# Patient Record
Sex: FEMALE | Race: WHITE | NOT HISPANIC OR LATINO | Employment: UNEMPLOYED | URBAN - METROPOLITAN AREA
[De-identification: names, ages, dates, MRNs, and addresses within clinical notes are randomized per-mention and may not be internally consistent; named-entity substitution may affect disease eponyms.]

---

## 2018-04-26 ENCOUNTER — HISTORICAL (OUTPATIENT)
Dept: LAB | Facility: HOSPITAL | Age: 58
End: 2018-04-26

## 2018-04-26 LAB
BUN SERPL-MCNC: 15 MG/DL (ref 7–18)
CALCIUM SERPL-MCNC: 9 MG/DL (ref 8.5–10.1)
CHLORIDE SERPL-SCNC: 105 MMOL/L (ref 98–107)
CO2 SERPL-SCNC: 26 MMOL/L (ref 21–32)
CREAT SERPL-MCNC: 1.27 MG/DL (ref 0.55–1.02)
CREAT/UREA NIT SERPL: 12
GLUCOSE SERPL-MCNC: 97 MG/DL (ref 74–106)
POTASSIUM SERPL-SCNC: 4.9 MMOL/L (ref 3.5–5.1)
SODIUM SERPL-SCNC: 140 MMOL/L (ref 136–145)

## 2018-12-14 ENCOUNTER — HISTORICAL (OUTPATIENT)
Dept: ADMINISTRATIVE | Facility: HOSPITAL | Age: 58
End: 2018-12-14

## 2019-08-15 ENCOUNTER — HISTORICAL (OUTPATIENT)
Dept: RADIOLOGY | Facility: HOSPITAL | Age: 59
End: 2019-08-15

## 2019-09-09 ENCOUNTER — HISTORICAL (OUTPATIENT)
Dept: RADIOLOGY | Facility: HOSPITAL | Age: 59
End: 2019-09-09

## 2019-09-30 ENCOUNTER — HISTORICAL (OUTPATIENT)
Dept: LAB | Facility: HOSPITAL | Age: 59
End: 2019-09-30

## 2019-10-07 LAB
HUMAN PAPILLOMAVIRUS (HPV): NORMAL
PAP RECOMMENDATION EXT: NORMAL
PAP SMEAR: NORMAL

## 2021-12-22 ENCOUNTER — HISTORICAL (OUTPATIENT)
Dept: ADMINISTRATIVE | Facility: HOSPITAL | Age: 61
End: 2021-12-22

## 2022-02-02 ENCOUNTER — HISTORICAL (OUTPATIENT)
Dept: ADMINISTRATIVE | Facility: HOSPITAL | Age: 62
End: 2022-02-02

## 2022-03-28 ENCOUNTER — HISTORICAL (OUTPATIENT)
Dept: ADMINISTRATIVE | Facility: HOSPITAL | Age: 62
End: 2022-03-28

## 2022-03-28 ENCOUNTER — HISTORICAL (OUTPATIENT)
Dept: RADIOLOGY | Facility: HOSPITAL | Age: 62
End: 2022-03-28

## 2022-04-07 ENCOUNTER — HISTORICAL (OUTPATIENT)
Dept: ADMINISTRATIVE | Facility: HOSPITAL | Age: 62
End: 2022-04-07
Payer: MEDICAID

## 2022-04-23 VITALS
BODY MASS INDEX: 33.32 KG/M2 | OXYGEN SATURATION: 98 % | SYSTOLIC BLOOD PRESSURE: 170 MMHG | WEIGHT: 212.31 LBS | DIASTOLIC BLOOD PRESSURE: 82 MMHG | HEIGHT: 67 IN

## 2022-05-02 NOTE — HISTORICAL [facility]ER
This is a historical note converted from [facility]fredo. Formatting and pictures may have been removed.  Please reference Wilfredo for original formatting and attached multimedia. Chief Complaint  Follow up for right arm fracture  History of Present Illness  Ms Baker comes in clinic today for follow-up assessment of her?right?Jayson 1 radial head fracture.?She reports that she continues to have pain in the right elbow radiating down the dorsum of her?right forearm into the dorsum of her right hand.?She reports this pain is worse when attempting to extend her elbow.  ?  In clinic today she also complains that her right knee has been bothering her.?I would like it assessed as well.?Explains to me that she had a long history of pain with her right knee?that?comes and goes. She reports that is particularly bad today?and she has been ambulating with a limp.  Review of Systems  Constitutional:?no fever, fatigue, weakness  Eye:?no vision loss, eye redness, drainage, or pain  ENMT:?no sore throat, ear pain, sinus pain/congestion, nasal congestion/drainage  Respiratory:?no cough, no wheezing, no shortness of breath  Cardiovascular:?no chest pain, no palpitations, no edema  Gastrointestinal:?no nausea, vomiting, or diarrhea. No abdominal pain  ?  ?  Physical Exam  Vitals & Measurements  HT:?170.00?cm? WT:?96.300?kg? BMI:?33.32?  Right elbow: Patient neurovascular intact distally right hand. Range of motion is from 130? of flexion to 10? of extension. There is some tenderness palpation over the radial head.  ?  Right knee: Patient neurovascular intact distally the right leg. Range of motion from?full extension 110? of flexion. Patellofemoral crepitus with range of motion. Some tenderness palpation of the medial lateral joint lines.  ?  X-ray right elbow performed today reviewed with patient: I still see the fracture line in the patients radial head. ?It is minimally displaced.? There is some interval callus formation.  ?  X-ray right  knee performed today reviewed with patient:?Mild degenerative changes.? No fracture/dislocations.  Assessment/Plan  Knee pain?M25.569  ?I had a long discussion with the patient regarding her elbow and knee injuries. ?With respect to her right elbow?educated the patient that we need to work on aggressive range of motion. ?We will get her started on physiotherapy today to restore range of motion of her elbow.? Recommend to limit her?weightbearing to avoid pushing out from a chair?or repositioning herself in bed?until?her pain is reduced.  ?  With regard to the right knee I offer the patient?an injection?in clinic today however she reports that is not particularly bothersome at todays visit.? She will?work on physiotherapy of the knee?while doing elbow range of motion exercises.? If she experiences an increase in pain she will call the clinic and come in for reassessment and possible injection.  ?  I would like to see the patient back in 6 to 8 weeks for reassessment of her right elbow.  Ordered:  Clinic Follow up, *Est. 02/02/22 3:00:00 CST, Order for future visit, Radial head fracture  Knee pain, [facility]  Office/Outpatient Visit Level 4 Established [id number] PC, Radial head fracture  Knee pain, [facility], 12/22/21 10:13:00 CST  XR Knee Right 4 or More Views, Routine, 12/22/21 10:09:00 CST, None, Ambulatory, Rad Type, Knee pain, Not Scheduled, 12/22/21 10:09:00 CST  ?  Radial head fracture?S52.123A  Ordered:  Clinic Follow up, *Est. 02/02/22 3:00:00 CST, Order for future visit, Radial head fracture  Knee pain, [facility]  Office/Outpatient Visit Level 4 Established [id number] PC, Radial head fracture  Knee pain, [facility], 12/22/21 10:13:00 CST  XR Elbow Right Minimum 3 Views, Routine, 12/22/21 10:09:00 CST, None, Ambulatory, Rad Type, Radial head fracture, Not Scheduled, 12/22/21 10:09:00 CST  ?  Referrals  Clinic Follow up, *Est. 02/02/22 3:00:00 CST, Order for future visit, Radial head fracture   Knee pain, [facility]   Problem List/Past Medical History  Ongoing  Anemia  Anxiety and depression  Atherosclerosis  Breast cancer screening  CAD (coronary artery disease)  GERD (gastroesophageal reflux disease)  HLD - Hyperlipidaemia  HTN - Hypertension  Hx of CABG  Insomnia  Obesity  PAD - Peripheral arterial disease  Smoker  TIA  Historical  Depression  Depression  Procedure/Surgical History  Bypass CABG (.) (02/19/2021)  Bypass Coronary Artery, One Artery from Left Internal Mammary, Open Approach (02/19/2021)  Bypass Coronary Artery, Three Arteries from Coronary Artery with Autologous Venous Tissue, Open Approach (02/19/2021)  Excision of Left Saphenous Vein, Open Approach (02/19/2021)  Performance of Cardiac Output, Continuous (02/19/2021)  Fluoroscopy of Left Heart using Other Contrast (02/17/2021)  Fluoroscopy of Multiple Coronary Arteries using Other Contrast (02/17/2021)  Measurement of Cardiac Sampling and Pressure, Left Heart, Percutaneous Approach (02/17/2021)  Ultrasonography of Heart with Aorta, Transesophageal (02/16/2021)  Dilation of Left Femoral Artery with Intraluminal Device, using Drug-Coated Balloon, Percutaneous Approach (12/10/2018)  Extirpation of Matter from Left Femoral Artery, Percutaneous Approach (12/10/2018)  Fluoroscopy of Abdominal Aorta using Other Contrast (12/10/2018)  Fluoroscopy of Left Lower Extremity Arteries using Other Contrast (12/10/2018)  Measurement of Arterial Flow, Peripheral, External Approach (12/10/2018)  Ultrasonography of Left Lower Extremity Veins (12/10/2018)  CEA - Carotid endarterectomy (2017)  Endarterectomy (2007)  Cardiac Stent placement (2003)  bilateral kidney stents   Medications  aspirin 81 mg oral Delayed Release (EC) tablet, 81 mg= 1 tab(s), Oral, Daily, 4 refills  atorvastatin 80 mg oral tablet, 80 mg= 1 tab(s), Oral, Daily  citalopram 40 mg oral tablet, 40 mg= 1 tab(s), Oral, Daily  cloniDINE 0.1 mg oral tablet, 0.1 mg= 1 tab(s), Oral, BID,  PRN, 1 refills  clopidogrel 75 mg oral tablet, 75 mg= 1 tab(s), Oral, Daily,? ?Not Taking per Prescriber  eszopiclone 2 mg oral tablet, 2 mg= 1 tab(s), Oral, qPM,? ?Not Taking, Completed Rx  fluticasone 50 mcg/inh nasal spray, 1 spray(s), Both Nostrils, Daily  gabapentin 300 mg oral capsule, 600 mg= 2 cap(s), Oral, QID, 1 refills  Hydroxyzine Pamoate 50 Mg Cap Sand, See Instructions  hydrOXYzine pamoate 50 mg oral capsule, See Instructions,? ?Not taking: duplicate  lisinopril 40 mg oral tablet, 20 mg= 0.5 tab(s), Oral, Daily, 4 refills,? ?Not Taking per Prescriber  Multi Vitamin+  nitroglycerin 0.4 mg sublingual TAB, 0.4 mg= 1 tab(s), SL, q5min, PRN, 1 refills  Pantoprazole 40 mg ORAL EC-Tablet, 40 mg= 1 tab(s), Oral, Daily  Toprol-XL 50 mg oral tablet, extended release, 50 mg= 1 tab(s), Oral, Daily, 4 refills,? ?Still taking, not as prescribed: 2 tabs qd  Allergies  No Known Allergies  Social History  Abuse/Neglect  No, 12/22/2021  Alcohol  Never, 02/06/2018  Employment/School  Retired, 12/20/2021  Exercise  Exercise duration: 0., 02/06/2018  Financial/Legal Situation  Social Security, 12/20/2021  Home/Environment  Lives with Spouse, grandson., 02/06/2018    Never in , 12/20/2021  Nutrition/Health  Regular, 02/06/2018  Sexual  Sexually active: Yes., 02/06/2018  Spiritual/Cultural  Non denomination, 12/20/2021  Substance Use  Current, Marijuana, Daily, 12/20/2021  Tobacco  10 or more cigarettes (1/2 pack or more)/day in last 30 days, No, 12/22/2021  Family History  Alzheimers disease: Sister.  Hyperthyroidism.: Sister.  Hypothyroidism.: Sister.  Metastatic cancer: Sister.  Primary malignant neoplasm of breast: Sister.  Stroke: Father.  Uterine cancer: Sister.  Immunizations  Vaccine Date Status Comments   influenza virus vaccine, inactivated 12/20/2021 Given Tolerated well   influenza virus vaccine, inactivated 09/28/2020 Given    influenza virus vaccine, inactivated 12/12/2018 Given     tetanus/diphth/pertuss (Tdap) adult/adol 09/05/2017 Recorded    influenza virus vaccine, inactivated 09/05/2017 Recorded    Health Maintenance  Health Maintenance  ???Pending?(in the next year)  ??? ??OverDue  ??? ? ? ?Alcohol Misuse Screening due??01/02/21??and every 1??year(s)  ??? ? ? ?Breast Cancer Screening due??09/08/21??and every 2??year(s)  ??? ??Due?  ??? ? ? ?Colorectal Screening due??12/22/21??Unknown Frequency  ??? ? ? ?Zoster Vaccine due??12/22/21??Unknown Frequency  ??? ??Due In Future?  ??? ? ? ?Obesity Screening not due until??01/01/22??and every 1??year(s)  ??? ? ? ?ADL Screening not due until??02/23/22??and every 1??year(s)  ??? ? ? ?Hypertension Management-Education not due until??03/10/22??and every 1??year(s)  ??? ? ? ?Aspirin Therapy for CVD Prevention not due until??04/10/22??and every 1??year(s)  ??? ? ? ?Hypertension Management-BMP not due until??06/23/22??and every 1??year(s)  ??? ? ? ?Cervical Cancer Screening not due until??09/29/22??and every 3??year(s)  ??? ? ? ?Blood Pressure Screening not due until??12/20/22??and every 1??year(s)  ??? ? ? ?Hypertension Management-Blood Pressure not due until??12/20/22??and every 1??year(s)  ???Satisfied?(in the past 1 year)  ??? ??Satisfied?  ??? ? ? ?ADL Screening on??02/23/21.??Satisfied by Juan Jose Bates  ??? ? ? ?Aspirin Therapy for CVD Prevention on??04/10/21.??Satisfied by Randi Salas RN  ??? ? ? ?Blood Pressure Screening on??12/20/21.??Satisfied by Vicky Ventura LPN.  ??? ? ? ?Body Mass Index Check on??12/22/21.??Satisfied by Dia Azevedo RN  ??? ? ? ?Coronary Artery Disease Maintenance-Lipid Lowering Therapy on??12/20/21.??Satisfied by Chary Smith NP  ??? ? ? ?Depression Screening on??12/22/21.??Satisfied by Dia Azevedo RN  ??? ? ? ?Diabetes Screening on??04/10/21.??Satisfied by Annette Flores  ??? ? ? ?Hypertension Management-Blood Pressure on??12/20/21.??Satisfied by Vicky Ventura LPN.  ??? ? ? ?Influenza  Vaccine on??12/20/21.??Satisfied by Vicky Ventura LPN.  ??? ? ? ?Obesity Screening on??12/22/21.??Satisfied by Dia Azevedo RN  ??? ??Refused?  ??? ? ? ?Zoster Vaccine on??04/22/21.??Recorded by Jazzy Atkins  ?

## 2022-05-03 DIAGNOSIS — N18.1 CKD (CHRONIC KIDNEY DISEASE) STAGE 1, GFR 90 ML/MIN OR GREATER: Primary | ICD-10-CM

## 2022-05-25 RX ORDER — GABAPENTIN 300 MG/1
300 CAPSULE ORAL 3 TIMES DAILY
Qty: 90 CAPSULE | Refills: 0 | Status: SHIPPED | OUTPATIENT
Start: 2022-05-25 | End: 2022-06-13 | Stop reason: SDUPTHER

## 2022-05-25 RX ORDER — GABAPENTIN 300 MG/1
300 CAPSULE ORAL 3 TIMES DAILY
Qty: 90 CAPSULE | Refills: 0 | Status: SHIPPED | OUTPATIENT
Start: 2022-05-25 | End: 2022-05-25 | Stop reason: SDUPTHER

## 2022-05-25 RX ORDER — GABAPENTIN 300 MG/1
CAPSULE ORAL
Qty: 90 CAPSULE | Refills: 0 | Status: SHIPPED | OUTPATIENT
Start: 2022-05-25 | End: 2022-05-25 | Stop reason: SDUPTHER

## 2022-05-30 ENCOUNTER — TELEPHONE (OUTPATIENT)
Dept: INTERNAL MEDICINE | Facility: CLINIC | Age: 62
End: 2022-05-30
Payer: MEDICAID

## 2022-05-30 NOTE — TELEPHONE ENCOUNTER
"Pt called requesting a script for Vancomycin. Pt stated I have C-Diff again, yesterday my blood pressure dropped to 74/44 and I'm feeling dizzy, I didn't check it today, but you know when you know what you have because you had it before, that along   with diarrhea, this all started Saturday 05/28". Pt denies sob, chest pain, nausea, vomiting . I questioned patient if he went to the emergency room when she took her blood pressure and it was 74/44. She stated " No, we are  going on a vacation with my grand childred, and here I start with this sh--, every thing is paid for". Informed patient I will send a message to WYATT Smith , she needs to go the the emergency room to be evaluated.   "

## 2022-05-30 NOTE — TELEPHONE ENCOUNTER
"Spoke with WYATT Smith, pt needs to go to the emergency room to be evaluated. I called pt to inform, she stated "okay christiana-, I'll go".   "

## 2022-06-15 ENCOUNTER — OFFICE VISIT (OUTPATIENT)
Dept: INTERNAL MEDICINE | Facility: CLINIC | Age: 62
End: 2022-06-15
Payer: MEDICAID

## 2022-06-15 VITALS
HEIGHT: 66 IN | DIASTOLIC BLOOD PRESSURE: 62 MMHG | SYSTOLIC BLOOD PRESSURE: 148 MMHG | OXYGEN SATURATION: 98 % | WEIGHT: 194.44 LBS | RESPIRATION RATE: 16 BRPM | TEMPERATURE: 98 F | BODY MASS INDEX: 31.25 KG/M2

## 2022-06-15 DIAGNOSIS — F33.0 MILD EPISODE OF RECURRENT MAJOR DEPRESSIVE DISORDER: ICD-10-CM

## 2022-06-15 DIAGNOSIS — F41.9 ANXIETY: Primary | ICD-10-CM

## 2022-06-15 PROCEDURE — 3008F PR BODY MASS INDEX (BMI) DOCUMENTED: ICD-10-PCS | Mod: CPTII,,, | Performed by: NURSE PRACTITIONER

## 2022-06-15 PROCEDURE — 3077F PR MOST RECENT SYSTOLIC BLOOD PRESSURE >= 140 MM HG: ICD-10-PCS | Mod: CPTII,,, | Performed by: NURSE PRACTITIONER

## 2022-06-15 PROCEDURE — 99214 OFFICE O/P EST MOD 30 MIN: CPT | Mod: S$PBB,,, | Performed by: NURSE PRACTITIONER

## 2022-06-15 PROCEDURE — 3008F BODY MASS INDEX DOCD: CPT | Mod: CPTII,,, | Performed by: NURSE PRACTITIONER

## 2022-06-15 PROCEDURE — 1160F PR REVIEW ALL MEDS BY PRESCRIBER/CLIN PHARMACIST DOCUMENTED: ICD-10-PCS | Mod: CPTII,,, | Performed by: NURSE PRACTITIONER

## 2022-06-15 PROCEDURE — 3077F SYST BP >= 140 MM HG: CPT | Mod: CPTII,,, | Performed by: NURSE PRACTITIONER

## 2022-06-15 PROCEDURE — 1160F RVW MEDS BY RX/DR IN RCRD: CPT | Mod: CPTII,,, | Performed by: NURSE PRACTITIONER

## 2022-06-15 PROCEDURE — 99214 OFFICE O/P EST MOD 30 MIN: CPT | Mod: PBBFAC | Performed by: NURSE PRACTITIONER

## 2022-06-15 PROCEDURE — 99214 PR OFFICE/OUTPT VISIT, EST, LEVL IV, 30-39 MIN: ICD-10-PCS | Mod: S$PBB,,, | Performed by: NURSE PRACTITIONER

## 2022-06-15 PROCEDURE — 3078F PR MOST RECENT DIASTOLIC BLOOD PRESSURE < 80 MM HG: ICD-10-PCS | Mod: CPTII,,, | Performed by: NURSE PRACTITIONER

## 2022-06-15 PROCEDURE — 3078F DIAST BP <80 MM HG: CPT | Mod: CPTII,,, | Performed by: NURSE PRACTITIONER

## 2022-06-15 PROCEDURE — 1159F PR MEDICATION LIST DOCUMENTED IN MEDICAL RECORD: ICD-10-PCS | Mod: CPTII,,, | Performed by: NURSE PRACTITIONER

## 2022-06-15 PROCEDURE — 4010F ACE/ARB THERAPY RXD/TAKEN: CPT | Mod: CPTII,,, | Performed by: NURSE PRACTITIONER

## 2022-06-15 PROCEDURE — 1159F MED LIST DOCD IN RCRD: CPT | Mod: CPTII,,, | Performed by: NURSE PRACTITIONER

## 2022-06-15 PROCEDURE — 4010F PR ACE/ARB THEARPY RXD/TAKEN: ICD-10-PCS | Mod: CPTII,,, | Performed by: NURSE PRACTITIONER

## 2022-06-15 RX ORDER — ATORVASTATIN CALCIUM 80 MG/1
80 TABLET, FILM COATED ORAL DAILY
COMMUNITY
Start: 2022-04-12 | End: 2023-07-13 | Stop reason: SDUPTHER

## 2022-06-15 RX ORDER — LISINOPRIL 40 MG/1
40 TABLET ORAL DAILY
COMMUNITY
Start: 2022-03-22 | End: 2024-01-29 | Stop reason: ALTCHOICE

## 2022-06-15 RX ORDER — FLUOXETINE HYDROCHLORIDE 20 MG/1
20 CAPSULE ORAL DAILY
Qty: 30 CAPSULE | Refills: 11 | Status: SHIPPED | OUTPATIENT
Start: 2022-06-15 | End: 2023-07-13

## 2022-06-15 RX ORDER — FLUOXETINE 10 MG/1
10 CAPSULE ORAL
COMMUNITY
Start: 2022-03-22 | End: 2022-06-15 | Stop reason: DRUGHIGH

## 2022-06-15 RX ORDER — METOPROLOL TARTRATE 50 MG/1
50 TABLET ORAL
COMMUNITY
End: 2023-07-13

## 2022-06-15 RX ORDER — GABAPENTIN 300 MG/1
300 CAPSULE ORAL 3 TIMES DAILY
Qty: 30 CAPSULE | Refills: 0 | Status: SHIPPED | OUTPATIENT
Start: 2022-06-15 | End: 2022-06-16

## 2022-06-15 RX ORDER — OMEPRAZOLE 20 MG/1
20 TABLET, DELAYED RELEASE ORAL EVERY MORNING
COMMUNITY

## 2022-06-15 RX ORDER — CLOPIDOGREL BISULFATE 75 MG/1
75 TABLET ORAL DAILY
COMMUNITY
Start: 2022-03-22 | End: 2022-10-24
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
## 2022-06-28 RX ORDER — FAMOTIDINE 20 MG/1
20 TABLET, FILM COATED ORAL
COMMUNITY
Start: 2022-02-08 | End: 2023-07-13

## 2022-06-28 RX ORDER — EZETIMIBE 10 MG/1
10 TABLET ORAL DAILY
COMMUNITY
Start: 2022-06-23

## 2022-06-29 ENCOUNTER — OFFICE VISIT (OUTPATIENT)
Dept: INTERNAL MEDICINE | Facility: CLINIC | Age: 62
End: 2022-06-29
Payer: MEDICAID

## 2022-06-29 VITALS
HEART RATE: 65 BPM | DIASTOLIC BLOOD PRESSURE: 89 MMHG | TEMPERATURE: 99 F | SYSTOLIC BLOOD PRESSURE: 149 MMHG | RESPIRATION RATE: 20 BRPM | WEIGHT: 194.63 LBS | BODY MASS INDEX: 31.28 KG/M2 | HEIGHT: 66 IN

## 2022-06-29 DIAGNOSIS — F41.9 ANXIETY: ICD-10-CM

## 2022-06-29 DIAGNOSIS — F33.9 RECURRENT MAJOR DEPRESSIVE DISORDER, REMISSION STATUS UNSPECIFIED: Primary | ICD-10-CM

## 2022-06-29 DIAGNOSIS — R45.4 IRRITABILITY: ICD-10-CM

## 2022-06-29 PROCEDURE — 3079F DIAST BP 80-89 MM HG: CPT | Mod: CPTII,,, | Performed by: NURSE PRACTITIONER

## 2022-06-29 PROCEDURE — 3008F PR BODY MASS INDEX (BMI) DOCUMENTED: ICD-10-PCS | Mod: CPTII,,, | Performed by: NURSE PRACTITIONER

## 2022-06-29 PROCEDURE — 1160F PR REVIEW ALL MEDS BY PRESCRIBER/CLIN PHARMACIST DOCUMENTED: ICD-10-PCS | Mod: CPTII,,, | Performed by: NURSE PRACTITIONER

## 2022-06-29 PROCEDURE — 99213 OFFICE O/P EST LOW 20 MIN: CPT | Mod: S$PBB,,, | Performed by: NURSE PRACTITIONER

## 2022-06-29 PROCEDURE — 4010F PR ACE/ARB THEARPY RXD/TAKEN: ICD-10-PCS | Mod: CPTII,,, | Performed by: NURSE PRACTITIONER

## 2022-06-29 PROCEDURE — 1159F PR MEDICATION LIST DOCUMENTED IN MEDICAL RECORD: ICD-10-PCS | Mod: CPTII,,, | Performed by: NURSE PRACTITIONER

## 2022-06-29 PROCEDURE — 3077F SYST BP >= 140 MM HG: CPT | Mod: CPTII,,, | Performed by: NURSE PRACTITIONER

## 2022-06-29 PROCEDURE — 99214 OFFICE O/P EST MOD 30 MIN: CPT | Mod: PBBFAC | Performed by: NURSE PRACTITIONER

## 2022-06-29 PROCEDURE — 1160F RVW MEDS BY RX/DR IN RCRD: CPT | Mod: CPTII,,, | Performed by: NURSE PRACTITIONER

## 2022-06-29 PROCEDURE — 3008F BODY MASS INDEX DOCD: CPT | Mod: CPTII,,, | Performed by: NURSE PRACTITIONER

## 2022-06-29 PROCEDURE — 1159F MED LIST DOCD IN RCRD: CPT | Mod: CPTII,,, | Performed by: NURSE PRACTITIONER

## 2022-06-29 PROCEDURE — 4010F ACE/ARB THERAPY RXD/TAKEN: CPT | Mod: CPTII,,, | Performed by: NURSE PRACTITIONER

## 2022-06-29 PROCEDURE — 3079F PR MOST RECENT DIASTOLIC BLOOD PRESSURE 80-89 MM HG: ICD-10-PCS | Mod: CPTII,,, | Performed by: NURSE PRACTITIONER

## 2022-06-29 PROCEDURE — 99213 PR OFFICE/OUTPT VISIT, EST, LEVL III, 20-29 MIN: ICD-10-PCS | Mod: S$PBB,,, | Performed by: NURSE PRACTITIONER

## 2022-06-29 PROCEDURE — 3077F PR MOST RECENT SYSTOLIC BLOOD PRESSURE >= 140 MM HG: ICD-10-PCS | Mod: CPTII,,, | Performed by: NURSE PRACTITIONER

## 2022-06-29 RX ORDER — GABAPENTIN 300 MG/1
300 CAPSULE ORAL 3 TIMES DAILY
Qty: 90 CAPSULE | Refills: 0 | Status: SHIPPED | OUTPATIENT
Start: 2022-06-29 | End: 2023-07-13
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
## 2022-08-02 RX ORDER — GABAPENTIN 300 MG/1
300 CAPSULE ORAL 3 TIMES DAILY
Qty: 90 CAPSULE | Refills: 0 | OUTPATIENT
Start: 2022-08-02 | End: 2023-08-02

## 2023-01-09 ENCOUNTER — DOCUMENTATION ONLY (OUTPATIENT)
Dept: ADMINISTRATIVE | Facility: HOSPITAL | Age: 63
End: 2023-01-09
Payer: MEDICAID

## 2023-02-08 ENCOUNTER — PATIENT MESSAGE (OUTPATIENT)
Dept: ADMINISTRATIVE | Facility: HOSPITAL | Age: 63
End: 2023-02-08

## 2023-07-13 ENCOUNTER — OFFICE VISIT (OUTPATIENT)
Dept: FAMILY MEDICINE | Facility: CLINIC | Age: 63
End: 2023-07-13
Payer: COMMERCIAL

## 2023-07-13 VITALS
DIASTOLIC BLOOD PRESSURE: 88 MMHG | TEMPERATURE: 99 F | SYSTOLIC BLOOD PRESSURE: 136 MMHG | WEIGHT: 146.5 LBS | RESPIRATION RATE: 16 BRPM | HEART RATE: 61 BPM | BODY MASS INDEX: 23.54 KG/M2 | HEIGHT: 66 IN | OXYGEN SATURATION: 99 %

## 2023-07-13 DIAGNOSIS — Z95.1 HX OF CABG: ICD-10-CM

## 2023-07-13 DIAGNOSIS — F41.8 MIXED ANXIETY DEPRESSIVE DISORDER: ICD-10-CM

## 2023-07-13 DIAGNOSIS — D64.9 ANEMIA, UNSPECIFIED TYPE: ICD-10-CM

## 2023-07-13 DIAGNOSIS — K21.9 GASTROESOPHAGEAL REFLUX DISEASE, UNSPECIFIED WHETHER ESOPHAGITIS PRESENT: ICD-10-CM

## 2023-07-13 DIAGNOSIS — Z53.29: ICD-10-CM

## 2023-07-13 DIAGNOSIS — Z78.0 POSTMENOPAUSAL: ICD-10-CM

## 2023-07-13 DIAGNOSIS — I10 PRIMARY HYPERTENSION: ICD-10-CM

## 2023-07-13 DIAGNOSIS — Z12.31 BREAST CANCER SCREENING BY MAMMOGRAM: ICD-10-CM

## 2023-07-13 DIAGNOSIS — E78.2 MIXED HYPERLIPIDEMIA: ICD-10-CM

## 2023-07-13 DIAGNOSIS — I77.9 PERIPHERAL ARTERIAL OCCLUSIVE DISEASE: ICD-10-CM

## 2023-07-13 DIAGNOSIS — F17.200 CURRENT SMOKER: ICD-10-CM

## 2023-07-13 DIAGNOSIS — Z00.00 WELLNESS EXAMINATION: Primary | ICD-10-CM

## 2023-07-13 DIAGNOSIS — I25.10 CORONARY ARTERY DISEASE, UNSPECIFIED VESSEL OR LESION TYPE, UNSPECIFIED WHETHER ANGINA PRESENT, UNSPECIFIED WHETHER NATIVE OR TRANSPLANTED HEART: ICD-10-CM

## 2023-07-13 DIAGNOSIS — Z12.11 COLON CANCER SCREENING: ICD-10-CM

## 2023-07-13 PROBLEM — E66.9 OBESITY: Status: ACTIVE | Noted: 2023-07-13

## 2023-07-13 PROBLEM — G47.00 INSOMNIA: Status: ACTIVE | Noted: 2023-07-13

## 2023-07-13 PROBLEM — E66.9 OBESITY: Status: RESOLVED | Noted: 2023-07-13 | Resolved: 2023-07-13

## 2023-07-13 PROBLEM — E78.5 HYPERLIPIDEMIA: Status: ACTIVE | Noted: 2023-07-13

## 2023-07-13 PROCEDURE — 99396 PR PREVENTIVE VISIT,EST,40-64: ICD-10-PCS | Mod: ,,, | Performed by: FAMILY MEDICINE

## 2023-07-13 PROCEDURE — 99396 PREV VISIT EST AGE 40-64: CPT | Mod: ,,, | Performed by: FAMILY MEDICINE

## 2023-07-13 RX ORDER — AMLODIPINE BESYLATE 2.5 MG/1
2.5 TABLET ORAL
COMMUNITY
Start: 2023-06-08 | End: 2023-07-13 | Stop reason: SDUPTHER

## 2023-07-13 RX ORDER — AMLODIPINE BESYLATE 2.5 MG/1
5 TABLET ORAL DAILY
Qty: 180 TABLET | Refills: 3
Start: 2023-07-13 | End: 2023-11-16 | Stop reason: SDUPTHER

## 2023-07-13 RX ORDER — ATORVASTATIN CALCIUM 80 MG/1
80 TABLET, FILM COATED ORAL DAILY
Qty: 90 TABLET | Refills: 3 | Status: SHIPPED | OUTPATIENT
Start: 2023-07-13 | End: 2024-07-12

## 2023-07-13 RX ORDER — METOPROLOL SUCCINATE 50 MG/1
50 TABLET, EXTENDED RELEASE ORAL EVERY MORNING
COMMUNITY
Start: 2023-06-19 | End: 2023-11-16 | Stop reason: SDUPTHER

## 2023-07-13 RX ORDER — FLUOXETINE HYDROCHLORIDE 40 MG/1
40 CAPSULE ORAL EVERY MORNING
COMMUNITY
Start: 2023-06-19 | End: 2024-01-29 | Stop reason: SDUPTHER

## 2023-07-13 NOTE — ASSESSMENT & PLAN NOTE
Fasting labs ordered. Will call with results when available  mmg ordered  dexa ordered  cologuard with previous pcp. Will request  Declines pap today. Plan on at next appt  Encouraged smoking cessation  Declines lung cancer screening

## 2023-07-13 NOTE — ASSESSMENT & PLAN NOTE
Initial reading elevated. Repeat improved to goal.  Reports high readings at home and headaches.  Will increase norvasc to 5mg daily. Monitor bp. Call clinic for concerns and when refill is needed. Continue on lisinopril and metoprolol. Keep appts with cards. Not on asa.

## 2023-07-13 NOTE — PROGRESS NOTES
"Subjective:        Patient ID: Damaris Baker is a 62 y.o. female.    Chief Complaint: Establish Care (New patient est care /She was a former patient of ours approx 3 years ago )      Patient presents to clinic to establish care. Previously seeing francois grajeda.  She would like a wellness visit today. She is due for labs    She has cad and had cabg x4 with dr. Peter 2/2021, pad, htn and hld. Has also had CEA.  She is swimming. Walking dog. She is retired.  Bp has been running high at home.  Sometimes getting headache.  Reports compliance with meds.  Needs refill of statin. Not taking asa. Not on plavix.  Her cards is dr. Workman/stephon.    She has anxiety/depression. She is on prozac 40mg daily. Doing well.     She has gerd.  Is on ppi    She has history of anemia. Is taking MVI with iron. Will check levels.     She is a smoker. Started at age 10. She declines lung cancer screening.  Mmg couple years ago. We will order. Cologuard done with previous pcp. We will request.  Last pap 10/2019 was normal and hpv negative. Declines pap today. Will plan at next appt.  Denies problems or concerns.  She is postmenopausal.  We will order dexa.    She is not allergic to any medications.              Review of Systems   Constitutional: Negative.    HENT: Negative.     Respiratory: Negative.     Cardiovascular: Negative.    Gastrointestinal: Negative.    Genitourinary: Negative.        Review of patient's allergies indicates:  No Known Allergies   Vitals:    07/13/23 1412 07/13/23 1441   BP: (!) 142/90 136/88   BP Location: Left arm    Pulse: 61    Resp: 16    Temp: 98.7 °F (37.1 °C)    TempSrc: Temporal    SpO2: 99%    Weight: 66.5 kg (146 lb 8 oz)    Height: 5' 6" (1.676 m)       Social History     Socioeconomic History    Marital status:    Occupational History    Occupation: unemployed   Tobacco Use    Smoking status: Every Day     Packs/day: 0.50     Years: 42.00     Pack years: 21.00     Types: Cigarettes, Vaping " with nicotine    Smokeless tobacco: Current   Substance and Sexual Activity    Alcohol use: Not Currently    Drug use: Yes     Types: Marijuana     Comment: several times a week    Sexual activity: Yes     Partners: Male     Birth control/protection: Post-menopausal      Family History   Problem Relation Age of Onset    Alcohol abuse Father     Heart disease Father     Hypertension Father     Stroke Father     Cancer Sister     Cancer Sister     Depression Sister           Objective:     Physical Exam  Vitals and nursing note reviewed.   Constitutional:       Appearance: Normal appearance. She is normal weight.   HENT:      Head: Normocephalic and atraumatic.      Nose: Nose normal.      Mouth/Throat:      Mouth: Mucous membranes are moist.      Pharynx: Oropharynx is clear.   Eyes:      Extraocular Movements: Extraocular movements intact.   Cardiovascular:      Rate and Rhythm: Normal rate and regular rhythm.      Pulses: Normal pulses.      Heart sounds: Normal heart sounds.   Pulmonary:      Effort: Pulmonary effort is normal.      Breath sounds: Normal breath sounds.   Musculoskeletal:         General: Normal range of motion.      Cervical back: Normal range of motion.   Skin:     General: Skin is warm and dry.   Neurological:      General: No focal deficit present.      Mental Status: She is alert and oriented to person, place, and time. Mental status is at baseline.   Psychiatric:         Mood and Affect: Mood normal.     Current Outpatient Medications on File Prior to Visit   Medication Sig Dispense Refill    ezetimibe (ZETIA) 10 mg tablet Take 10 mg by mouth once daily.      FLUoxetine 40 MG capsule Take 40 mg by mouth every morning.      metoprolol succinate (TOPROL-XL) 50 MG 24 hr tablet Take 50 mg by mouth every morning.      multivit-min/ferrous fumarate (MULTI VITAMIN ORAL)   OTC G REEN AND RED multi vitamin, 0 Refill(s)      omeprazole (PRILOSEC OTC) 20 MG tablet Take 20 mg by mouth.       [DISCONTINUED] amLODIPine (NORVASC) 2.5 MG tablet Take 2.5 mg by mouth.      [DISCONTINUED] atorvastatin (LIPITOR) 80 MG tablet Take 80 mg by mouth once daily.      lisinopriL (PRINIVIL,ZESTRIL) 40 MG tablet Take 40 mg by mouth once daily.      [DISCONTINUED] clopidogreL (PLAVIX) 75 mg tablet TAKE ONE TABLET BY MOUTH ONE TIME DAILY (Patient not taking: Reported on 7/13/2023) 90 tablet 0    [DISCONTINUED] famotidine (PEPCID) 20 MG tablet Take 20 mg by mouth.      [DISCONTINUED] FLUoxetine 20 MG capsule Take 1 capsule (20 mg total) by mouth once daily. 30 capsule 11    [DISCONTINUED] gabapentin (NEURONTIN) 300 MG capsule Take 1 capsule (300 mg total) by mouth 3 (three) times daily. 90 capsule 0    [DISCONTINUED] metoprolol tartrate (LOPRESSOR) 50 MG tablet Take 50 mg by mouth.      [DISCONTINUED] valACYclovir (VALTREX) 1000 MG tablet Take 1,000 mg by mouth 3 (three) times daily.       No current facility-administered medications on file prior to visit.     Health Maintenance   Topic Date Due    Hepatitis C Screening  Never done    Mammogram  03/28/2023    LDCT Lung Screen  07/13/2024 (Originally 2/5/2023)    High Dose Statin  07/13/2024    Lipid Panel  08/29/2027    TETANUS VACCINE  09/05/2027      Results for orders placed or performed in visit on 01/09/23   HPV DNA probe, amplified    Specimen: Cervix; Genital   Result Value Ref Range    HPV DNA None Detected None Detected   HM PAP SMEAR   Result Value Ref Range    PAP Recommendation External No follow-up frequency specified     Pap Negative for intraephithelial lesion or malignancy Negative for intraephithelial lesion or malignancy, Other          Assessment & Plan:     Active Problem List with Overview Notes    Diagnosis Date Noted    Gastroesophageal reflux disease 07/13/2023    Hyperlipidemia 07/13/2023    Mixed anxiety depressive disorder 07/13/2023    Peripheral arterial occlusive disease 07/13/2023    Insomnia 07/13/2023    Current smoker 07/13/2023    Hx of  CABG 07/13/2023    Breast cancer screening by mammogram 07/13/2023    Anemia 07/13/2023    Colon cancer screening 07/13/2023    Wellness examination 07/13/2023    Postmenopausal 07/13/2023    Pap smear of cervix declined because of time constraints 07/13/2023    CAD (coronary artery disease) 05/31/2022    Primary hypertension 05/31/2022       1. Wellness examination  Assessment & Plan:  Fasting labs ordered. Will call with results when available  mmg ordered  dexa ordered  cologuard with previous pcp. Will request  Declines pap today. Plan on at next appt  Encouraged smoking cessation  Declines lung cancer screening    Orders:  -     CBC Auto Differential; Future; Expected date: 07/13/2023  -     Comprehensive Metabolic Panel; Future; Expected date: 07/13/2023  -     Lipid Panel; Future; Expected date: 07/13/2023  -     TSH; Future; Expected date: 07/13/2023  -     Urinalysis; Future; Expected date: 07/13/2023  -     Hepatitis C Antibody; Future; Expected date: 07/13/2023  -     Ferritin; Future; Expected date: 07/13/2023  -     Iron and TIBC; Future; Expected date: 07/13/2023    2. Colon cancer screening  Assessment & Plan:  cologuard with previous pcp. Will request record    Orders:  -     CBC Auto Differential; Future; Expected date: 07/13/2023  -     Comprehensive Metabolic Panel; Future; Expected date: 07/13/2023  -     Lipid Panel; Future; Expected date: 07/13/2023  -     TSH; Future; Expected date: 07/13/2023  -     Urinalysis; Future; Expected date: 07/13/2023  -     Hepatitis C Antibody; Future; Expected date: 07/13/2023  -     Ferritin; Future; Expected date: 07/13/2023  -     Iron and TIBC; Future; Expected date: 07/13/2023    3. Primary hypertension  Assessment & Plan:  Initial reading elevated. Repeat improved to goal.  Reports high readings at home and headaches.  Will increase norvasc to 5mg daily. Monitor bp. Call clinic for concerns and when refill is needed. Continue on lisinopril and metoprolol.  Keep appts with cards. Not on asa.     Orders:  -     CBC Auto Differential; Future; Expected date: 07/13/2023  -     Comprehensive Metabolic Panel; Future; Expected date: 07/13/2023  -     Lipid Panel; Future; Expected date: 07/13/2023  -     TSH; Future; Expected date: 07/13/2023  -     Urinalysis; Future; Expected date: 07/13/2023  -     Hepatitis C Antibody; Future; Expected date: 07/13/2023  -     amLODIPine (NORVASC) 2.5 MG tablet; Take 2 tablets (5 mg total) by mouth once daily.  Dispense: 180 tablet; Refill: 3  -     Ferritin; Future; Expected date: 07/13/2023  -     Iron and TIBC; Future; Expected date: 07/13/2023    4. Mixed hyperlipidemia  Assessment & Plan:  On statin and zetia. Keep appts with cards. Refill of statin sent in as requested.     Orders:  -     CBC Auto Differential; Future; Expected date: 07/13/2023  -     Comprehensive Metabolic Panel; Future; Expected date: 07/13/2023  -     Lipid Panel; Future; Expected date: 07/13/2023  -     TSH; Future; Expected date: 07/13/2023  -     Urinalysis; Future; Expected date: 07/13/2023  -     Hepatitis C Antibody; Future; Expected date: 07/13/2023  -     atorvastatin (LIPITOR) 80 MG tablet; Take 1 tablet (80 mg total) by mouth once daily.  Dispense: 90 tablet; Refill: 3  -     Ferritin; Future; Expected date: 07/13/2023  -     Iron and TIBC; Future; Expected date: 07/13/2023    5. Coronary artery disease, unspecified vessel or lesion type, unspecified whether angina present, unspecified whether native or transplanted heart  Assessment & Plan:  Stable. Keep appts with cards    Orders:  -     CBC Auto Differential; Future; Expected date: 07/13/2023  -     Comprehensive Metabolic Panel; Future; Expected date: 07/13/2023  -     Lipid Panel; Future; Expected date: 07/13/2023  -     TSH; Future; Expected date: 07/13/2023  -     Urinalysis; Future; Expected date: 07/13/2023  -     Hepatitis C Antibody; Future; Expected date: 07/13/2023  -     Ferritin; Future;  Expected date: 07/13/2023  -     Iron and TIBC; Future; Expected date: 07/13/2023    6. Peripheral arterial occlusive disease  Assessment & Plan:  Keep appts with cards    Orders:  -     CBC Auto Differential; Future; Expected date: 07/13/2023  -     Comprehensive Metabolic Panel; Future; Expected date: 07/13/2023  -     Lipid Panel; Future; Expected date: 07/13/2023  -     TSH; Future; Expected date: 07/13/2023  -     Urinalysis; Future; Expected date: 07/13/2023  -     Hepatitis C Antibody; Future; Expected date: 07/13/2023  -     Ferritin; Future; Expected date: 07/13/2023  -     Iron and TIBC; Future; Expected date: 07/13/2023    7. Mixed anxiety depressive disorder  Assessment & Plan:  Stable on prozac 40mg.     Orders:  -     CBC Auto Differential; Future; Expected date: 07/13/2023  -     Comprehensive Metabolic Panel; Future; Expected date: 07/13/2023  -     Lipid Panel; Future; Expected date: 07/13/2023  -     TSH; Future; Expected date: 07/13/2023  -     Urinalysis; Future; Expected date: 07/13/2023  -     Hepatitis C Antibody; Future; Expected date: 07/13/2023  -     Ferritin; Future; Expected date: 07/13/2023  -     Iron and TIBC; Future; Expected date: 07/13/2023    8. Gastroesophageal reflux disease, unspecified whether esophagitis present  Assessment & Plan:  Stable on ppi    Orders:  -     CBC Auto Differential; Future; Expected date: 07/13/2023  -     Comprehensive Metabolic Panel; Future; Expected date: 07/13/2023  -     Lipid Panel; Future; Expected date: 07/13/2023  -     TSH; Future; Expected date: 07/13/2023  -     Urinalysis; Future; Expected date: 07/13/2023  -     Hepatitis C Antibody; Future; Expected date: 07/13/2023  -     Ferritin; Future; Expected date: 07/13/2023  -     Iron and TIBC; Future; Expected date: 07/13/2023    9. Breast cancer screening by mammogram  Assessment & Plan:  mmg ordered    Orders:  -     CBC Auto Differential; Future; Expected date: 07/13/2023  -     Comprehensive  Metabolic Panel; Future; Expected date: 07/13/2023  -     Lipid Panel; Future; Expected date: 07/13/2023  -     TSH; Future; Expected date: 07/13/2023  -     Urinalysis; Future; Expected date: 07/13/2023  -     Hepatitis C Antibody; Future; Expected date: 07/13/2023  -     Mammo Digital Screening Bilat w/ Michael; Future; Expected date: 07/13/2023  -     Ferritin; Future; Expected date: 07/13/2023  -     Iron and TIBC; Future; Expected date: 07/13/2023    10. Anemia, unspecified type  Assessment & Plan:  Taking MVI with iron.  Will check iron levels with wellness labs.    Orders:  -     Ferritin; Future; Expected date: 07/13/2023  -     Iron and TIBC; Future; Expected date: 07/13/2023    11. Current smoker  Assessment & Plan:  Encouraged cessation. Declines lung cancer screening.  Referral placed    Orders:  -     Ambulatory referral/consult to Smoking Cessation Program; Future; Expected date: 07/20/2023    12. Postmenopausal  Assessment & Plan:  dexa ordered    Orders:  -     DXA Bone Density Axial Skeleton 1 or more sites; Future; Expected date: 07/13/2023    13. Pap smear of cervix declined because of time constraints  Assessment & Plan:  Declines pap today. Will plan to do at next appt. Last pap 10/2019 normal and hpv negative.      14. Hx of CABG  Assessment & Plan:  cabg x4 2/2021 with dr. Peter. Keep appts with cards           Follow up in about 1 year (around 7/13/2024) for Wellness with Labs.

## 2023-07-24 ENCOUNTER — PATIENT MESSAGE (OUTPATIENT)
Dept: ADMINISTRATIVE | Facility: HOSPITAL | Age: 63
End: 2023-07-24
Payer: COMMERCIAL

## 2023-08-29 ENCOUNTER — PATIENT MESSAGE (OUTPATIENT)
Dept: ADMINISTRATIVE | Facility: HOSPITAL | Age: 63
End: 2023-08-29
Payer: COMMERCIAL

## 2023-09-07 ENCOUNTER — PATIENT MESSAGE (OUTPATIENT)
Dept: RESEARCH | Facility: HOSPITAL | Age: 63
End: 2023-09-07
Payer: COMMERCIAL

## 2023-09-26 DIAGNOSIS — R22.2 LOCALIZED SWELLING, MASS AND LUMP, TRUNK: Primary | ICD-10-CM

## 2023-10-03 ENCOUNTER — HOSPITAL ENCOUNTER (OUTPATIENT)
Dept: RADIOLOGY | Facility: HOSPITAL | Age: 63
Discharge: HOME | End: 2023-10-03
Attending: SPECIALIST
Payer: COMMERCIAL

## 2023-10-03 DIAGNOSIS — R22.2 LOCALIZED SWELLING, MASS AND LUMP, TRUNK: ICD-10-CM

## 2023-10-03 PROCEDURE — 71250 CT THORAX DX C-: CPT | Mod: TC

## 2023-10-16 PROBLEM — Z00.00 WELLNESS EXAMINATION: Status: RESOLVED | Noted: 2023-07-13 | Resolved: 2023-10-16

## 2023-11-16 ENCOUNTER — TELEPHONE (OUTPATIENT)
Dept: FAMILY MEDICINE | Facility: CLINIC | Age: 63
End: 2023-11-16
Payer: COMMERCIAL

## 2023-11-16 DIAGNOSIS — I10 PRIMARY HYPERTENSION: ICD-10-CM

## 2023-11-16 RX ORDER — AMLODIPINE BESYLATE 2.5 MG/1
5 TABLET ORAL DAILY
Qty: 180 TABLET | Refills: 3
Start: 2023-11-16 | End: 2024-01-09 | Stop reason: SDUPTHER

## 2023-11-16 RX ORDER — METOPROLOL SUCCINATE 50 MG/1
50 TABLET, EXTENDED RELEASE ORAL EVERY MORNING
Qty: 90 TABLET | Refills: 2 | Status: SHIPPED | OUTPATIENT
Start: 2023-11-16 | End: 2024-02-09 | Stop reason: ALTCHOICE

## 2023-11-16 NOTE — TELEPHONE ENCOUNTER
I spoke with patient and she needed refills on metoprolol and amlodipine. Dosing and instructions verified and rx sent. Patient aware it would be sent. Abhinav

## 2023-11-16 NOTE — TELEPHONE ENCOUNTER
----- Message from Willie Gallardo sent at 11/16/2023  1:18 PM CST -----  .Type:  Needs Medical Advice    Who Called: Damaris  Symptoms (please be specific):    How long has patient had these symptoms:    Pharmacy name and phone #:    Would the patient rather a call back or a response via [facility]?   Best Call Back Number: [phone]  Additional Information: Requested a call back form the nurse re: speak with her directly did offer to assist but no message.

## 2024-01-09 ENCOUNTER — TELEPHONE (OUTPATIENT)
Dept: FAMILY MEDICINE | Facility: CLINIC | Age: 64
End: 2024-01-09

## 2024-01-09 DIAGNOSIS — I10 PRIMARY HYPERTENSION: ICD-10-CM

## 2024-01-09 RX ORDER — AMLODIPINE BESYLATE 5 MG/1
5 TABLET ORAL DAILY
Qty: 90 TABLET | Refills: 3 | Status: SHIPPED | OUTPATIENT
Start: 2024-01-09 | End: 2024-01-29 | Stop reason: SDUPTHER

## 2024-01-09 NOTE — TELEPHONE ENCOUNTER
Why has she been out of amlodipine? Did pharmacy not have in stock?  I will send in refill for 5mg once daily.  Continue to monitor bp.  How is her HR on metoprolol bid?  Monitor closely.  Er precautions.  She can call cards office to see if they can move her appt up as well.      I have signed for the following orders AND/OR meds.  Please call the patient and ask the patient to schedule the testing AND/OR inform about any medications that were sent.        Medications Ordered This Encounter   Medications    amLODIPine (NORVASC) 5 MG tablet     Sig: Take 1 tablet (5 mg total) by mouth once daily.     Dispense:  90 tablet     Refill:  3     .

## 2024-01-09 NOTE — TELEPHONE ENCOUNTER
Patient stated during the day her bp has been running around 200/99. She said she takes her bp meds in the evening. She has been out of amlodipine for one month she typically takes 5 mg but has not taken any in the past month. She does have metoprolol 50 mg that she takes nightly, but this week she started taking an extra 50 mg tablet during the day. She does go to see Dr. Min on January 31st. She would like to know what to do between now and then.    I did advise ED precautions between now and the time that I call her with your recommendations. Patient verbalized understanding

## 2024-01-09 NOTE — TELEPHONE ENCOUNTER
----- Message from Chelsey Rider sent at 1/9/2024  3:26 PM CST -----  Regarding: call  .Type:  Needs Medical Advice    Who Called: pt  Would the patient rather a call back or a response via [facility]? margarito  Best Call Back Number: [phone]  Additional Information: Very - Request call back about blood pressure

## 2024-01-09 NOTE — TELEPHONE ENCOUNTER
HR has been low in the 50s. I told her to take metoprolol once a day instead of twice and to start amlodipine 5 again. I told her to log her Bps and call me next week with the log so that we can adjust her medication accordingly. I also advised her to call stephon's office to notify them of her symptoms as they may want to move her appt up. She verbalized understanding

## 2024-01-24 ENCOUNTER — TELEPHONE (OUTPATIENT)
Dept: FAMILY MEDICINE | Facility: CLINIC | Age: 64
End: 2024-01-24

## 2024-01-24 NOTE — TELEPHONE ENCOUNTER
Patient called 01/09/24 (see message in chart) regarding high BP reading. She called today to notify since then her BP is still high. She takes 50 mg metoprolol daily and 5 mg of amlodipine daily. She asked if there needs to be any kind of adjustment to her medication?       She also wanted me to note that Dr. Zhang ordered her to have a CTA, but she cannot afford it currently and will be moving the test back to march. She asked if this was safe to do, I told her that it would be best for her to call Dr. Zhang to discuss that concern since he is the ordering provider. Dr. Rice currently controls her BP medication.    

## 2024-01-24 NOTE — TELEPHONE ENCOUNTER
Pt called. Instructed to take (2) tablets of Amlodipine 5 mg at night; keep record of blood reading, bring to appt on 1/29/24.  Voiced understood.

## 2024-01-24 NOTE — TELEPHONE ENCOUNTER
Pt called clinic to report BP has been remaining elevated. Last reading was 170/92 on today.   Takes Amlodipine and Metoprolol at night. Inquiring if she should double up on Amlodipine until appt on 1/29/24.  Please advise...

## 2024-01-24 NOTE — TELEPHONE ENCOUNTER
----- Message from Marisela Torre sent at 1/24/2024 11:07 AM CST -----  Regarding: return call  Type:  Patient Returning Call    Who Called: pt    Who Left Message for Patient: none    Does the patient know what this is regarding?: health questions    Would the patient rather a call back or a response via [facility]? Yes    Best Call Back Number: [phone]    Additional Information:  pt called for return call back, pt stated she has spoken to the nurse/doctor  before in regards to her blood pressure and would like to discuss again, please advise, thanks

## 2024-01-25 DIAGNOSIS — I25.10 CAD (CORONARY ARTERY DISEASE): Primary | ICD-10-CM

## 2024-01-25 DIAGNOSIS — I67.9 CVD (CEREBROVASCULAR DISEASE): ICD-10-CM

## 2024-01-29 ENCOUNTER — OFFICE VISIT (OUTPATIENT)
Dept: FAMILY MEDICINE | Facility: CLINIC | Age: 64
End: 2024-01-29
Payer: COMMERCIAL

## 2024-01-29 DIAGNOSIS — I10 PRIMARY HYPERTENSION: Primary | ICD-10-CM

## 2024-01-29 PROCEDURE — 99214 OFFICE O/P EST MOD 30 MIN: CPT | Mod: 95,,, | Performed by: NURSE PRACTITIONER

## 2024-01-29 RX ORDER — LISINOPRIL 5 MG/1
5 TABLET ORAL DAILY
Qty: 90 TABLET | Refills: 3 | Status: SHIPPED | OUTPATIENT
Start: 2024-01-29 | End: 2024-03-27

## 2024-01-29 RX ORDER — AMLODIPINE BESYLATE 10 MG/1
10 TABLET ORAL DAILY
Qty: 90 TABLET | Refills: 3 | Status: SHIPPED | OUTPATIENT
Start: 2024-01-29 | End: 2025-01-28

## 2024-01-29 RX ORDER — FLUOXETINE HYDROCHLORIDE 40 MG/1
40 CAPSULE ORAL EVERY MORNING
Qty: 90 CAPSULE | Refills: 3 | Status: SHIPPED | OUTPATIENT
Start: 2024-01-29 | End: 2024-05-10 | Stop reason: SDUPTHER

## 2024-01-29 NOTE — PROGRESS NOTES
Subjective:      Patient ID: Damaris Baker is a 63 y.o. White female      Chief Complaint: Hypertension    This is a telemedicine note. Patient was treated using telemedicine, real-time audio and video. I, distant provider, conducted the visit from location identified below. The patient participated in the visit at a non- [facility] location selected by the patient identified below. I am licensed in the state where the patient stated they are located. The patient stated that they understood and accepted the privacy and security risks to their information at their location.  Patient was located at home.  I, distant provider, was located at [facility]      Past Medical History:   Diagnosis Date    Anemia 07/13/2023    CAD (coronary artery disease) 05/31/2022    Current smoker 07/13/2023    Gastroesophageal reflux disease 07/13/2023    Hx of CABG 07/13/2023    Hypertension     Mixed anxiety depressive disorder 07/13/2023    Peripheral arterial occlusive disease 07/13/2023        HPI  HTN: BP had been elevated at home.  She has recently increased Norvasc to 5 mg (two tablets) daily and Metoprolol XL 50 mg po daily.  States BP has improved, but remains elevated 140's-160's.  Denies any headaches, weakness, dizziness, CP, or SOB.          Patient Care Team:  Diann Rice MD as PCP - General (Family Medicine)  Donald Workman MD as Consulting Physician (Cardiology)  Lalo Peter IV, MD as Consulting Physician (Cardiothoracic Surgery)      Review of Systems   Constitutional: Negative.  Negative for appetite change, chills and fever.   HENT: Negative.     Eyes: Negative.  Negative for discharge and redness.   Respiratory: Negative.  Negative for shortness of breath.    Cardiovascular: Negative.  Negative for chest pain.   Gastrointestinal: Negative.    Endocrine: Negative.    Genitourinary: Negative.    Integumentary:  Negative.   Allergic/Immunologic: Negative.    Neurological: Negative.     Psychiatric/Behavioral: Negative.     All other systems reviewed and are negative.          Objective:      There were no vitals filed for this visit.   There is no height or weight on file to calculate BMI.     Physical Exam  Constitutional:       Appearance: Normal appearance.   Pulmonary:      Effort: No respiratory distress.   Neurological:      General: No focal deficit present.      Mental Status: She is alert and oriented to person, place, and time.            Current Outpatient Medications:     atorvastatin (LIPITOR) 80 MG tablet, Take 1 tablet (80 mg total) by mouth once daily., Disp: 90 tablet, Rfl: 3    ezetimibe (ZETIA) 10 mg tablet, Take 10 mg by mouth once daily., Disp: , Rfl:     metoprolol succinate (TOPROL-XL) 50 MG 24 hr tablet, Take 1 tablet (50 mg total) by mouth every morning., Disp: 90 tablet, Rfl: 2    multivit-min/ferrous fumarate (MULTI VITAMIN ORAL),  OTC G REEN AND RED multi vitamin, 0 Refill(s), Disp: , Rfl:     omeprazole (PRILOSEC OTC) 20 MG tablet, Take 20 mg by mouth., Disp: , Rfl:     amLODIPine (NORVASC) 10 MG tablet, Take 1 tablet (10 mg total) by mouth once daily., Disp: 90 tablet, Rfl: 3    FLUoxetine 40 MG capsule, Take 1 capsule (40 mg total) by mouth every morning., Disp: 90 capsule, Rfl: 3    lisinopriL (PRINIVIL,ZESTRIL) 5 MG tablet, Take 1 tablet (5 mg total) by mouth once daily., Disp: 90 tablet, Rfl: 3    Assessment & Plan:     Problem List Items Addressed This Visit          Cardiac/Vascular    Primary hypertension - Primary     BP elevated; Asymptomatic  Currently taking Norvasc to 10 mg daily and Metoprolol XL 50 mg po daily.     Continue Norvasc and Metoprolol  Add Lisinopril 5 mg po daily; Rx sent  Instructed to take BP meds prior to all clinic appointments  Instructed to report to ED for any BP >200/100, SOB, CP, and/or worsening symptoms  Daily BP check; bring log to all appointments  Keep appt for follow up  Verbalizes all understanding           Relevant  Medications    amLODIPine (NORVASC) 10 MG tablet    lisinopriL (PRINIVIL,ZESTRIL) 5 MG tablet            Total time:  12 minutes

## 2024-01-29 NOTE — ASSESSMENT & PLAN NOTE
BP elevated; Asymptomatic  Currently taking Norvasc to 10 mg daily and Metoprolol XL 50 mg po daily.     Continue Norvasc and Metoprolol  Add Lisinopril 5 mg po daily; Rx sent  Instructed to take BP meds prior to all clinic appointments  Instructed to report to ED for any BP >200/100, SOB, CP, and/or worsening symptoms  Daily BP check; bring log to all appointments  Keep appt for follow up  Verbalizes all understanding

## 2024-02-09 ENCOUNTER — OFFICE VISIT (OUTPATIENT)
Dept: FAMILY MEDICINE | Facility: CLINIC | Age: 64
End: 2024-02-09
Payer: COMMERCIAL

## 2024-02-09 DIAGNOSIS — I10 PRIMARY HYPERTENSION: Primary | ICD-10-CM

## 2024-02-09 PROCEDURE — 99213 OFFICE O/P EST LOW 20 MIN: CPT | Mod: 95,,, | Performed by: NURSE PRACTITIONER

## 2024-02-09 RX ORDER — METOPROLOL TARTRATE 25 MG/1
25 TABLET, FILM COATED ORAL DAILY
COMMUNITY
End: 2024-03-27

## 2024-02-09 NOTE — PROGRESS NOTES
Subjective:      Patient ID: Damaris Baker is a 63 y.o. White female      Chief Complaint: Hypertension    This is a telemedicine note. Patient was treated using telemedicine, real-time audio and video. I, distant provider, conducted the visit from location identified below. The patient participated in the visit at a non- [facility] location selected by the patient identified below. I am licensed in the state where the patient stated they are located. The patient stated that they understood and accepted the privacy and security risks to their information at their location.  Patient was located at home.  I, distant provider, was located at [facility]       Past Medical History:   Diagnosis Date    Anemia 07/13/2023    CAD (coronary artery disease) 05/31/2022    Current smoker 07/13/2023    Gastroesophageal reflux disease 07/13/2023    Hx of CABG 07/13/2023    Hypertension     Mixed anxiety depressive disorder 07/13/2023    Peripheral arterial occlusive disease 07/13/2023        HPI  HTN: BP had been elevated at home.  Medications changes made at previous visit; cardiologist has also made changes.  Currently taking Norvasc 10 mg po daily, Metoprolol XL 25 mg po daily, and Lisinopril 5 mg po daily.  States BP has improved, but remains elevated 130's/80's at home.  Denies any headaches, weakness, dizziness, CP, or SOB.              Patient Care Team:  Diann Rice MD as PCP - General (Family Medicine)  Lalo Peter IV, MD as Consulting Physician (Cardiothoracic Surgery)  Akshat Min MD as Consulting Physician (Cardiology)      Review of Systems   Constitutional: Negative.  Negative for appetite change, chills and fever.   HENT: Negative.     Eyes: Negative.  Negative for discharge and redness.   Respiratory: Negative.  Negative for shortness of breath.    Cardiovascular: Negative.  Negative for chest pain.   Gastrointestinal: Negative.    Endocrine: Negative.    Genitourinary: Negative.     Integumentary:  Negative.   Allergic/Immunologic: Negative.    Neurological: Negative.    Psychiatric/Behavioral: Negative.     All other systems reviewed and are negative.          Objective:      There were no vitals filed for this visit.   There is no height or weight on file to calculate BMI.     Physical Exam  Constitutional:       Appearance: Normal appearance.   Pulmonary:      Effort: No respiratory distress.   Neurological:      General: No focal deficit present.      Mental Status: She is alert and oriented to person, place, and time.            Current Outpatient Medications:     amLODIPine (NORVASC) 10 MG tablet, Take 1 tablet (10 mg total) by mouth once daily., Disp: 90 tablet, Rfl: 3    atorvastatin (LIPITOR) 80 MG tablet, Take 1 tablet (80 mg total) by mouth once daily., Disp: 90 tablet, Rfl: 3    ezetimibe (ZETIA) 10 mg tablet, Take 10 mg by mouth once daily., Disp: , Rfl:     FLUoxetine 40 MG capsule, Take 1 capsule (40 mg total) by mouth every morning., Disp: 90 capsule, Rfl: 3    lisinopriL (PRINIVIL,ZESTRIL) 5 MG tablet, Take 1 tablet (5 mg total) by mouth once daily., Disp: 90 tablet, Rfl: 3    metoprolol tartrate (LOPRESSOR) 25 MG tablet, Take 25 mg by mouth once daily., Disp: , Rfl:     multivit-min/ferrous fumarate (MULTI VITAMIN ORAL),  OTC G REEN AND RED multi vitamin, 0 Refill(s), Disp: , Rfl:     omeprazole (PRILOSEC OTC) 20 MG tablet, Take 20 mg by mouth., Disp: , Rfl:     Assessment & Plan:     Problem List Items Addressed This Visit          Cardiac/Vascular    Primary hypertension - Primary     BP improved  Continue  Norvasc 10 mg po daily, Metoprolol XL 25 mg po daily, and Lisinopril 5 mg po daily as prescribed   Instructed to take BP meds prior to all clinic appointments  Instructed to report to ED for any BP >200/100, SOB, CP, and/or worsening symptoms  Daily BP check; bring log to all appointments  Keep appt for follow up  Verbalizes all understanding

## 2024-02-09 NOTE — ASSESSMENT & PLAN NOTE
BP improved  Continue  Norvasc 10 mg po daily, Metoprolol XL 25 mg po daily, and Lisinopril 5 mg po daily as prescribed   Instructed to take BP meds prior to all clinic appointments  Instructed to report to ED for any BP >200/100, SOB, CP, and/or worsening symptoms  Daily BP check; bring log to all appointments  Keep appt for follow up  Verbalizes all understanding

## 2024-03-06 ENCOUNTER — LAB VISIT (OUTPATIENT)
Dept: LAB | Facility: HOSPITAL | Age: 64
End: 2024-03-06
Attending: INTERNAL MEDICINE
Payer: COMMERCIAL

## 2024-03-06 DIAGNOSIS — E78.5 HYPERLIPIDEMIA, UNSPECIFIED HYPERLIPIDEMIA TYPE: ICD-10-CM

## 2024-03-06 DIAGNOSIS — I10 HYPERTENSION, UNSPECIFIED TYPE: Primary | ICD-10-CM

## 2024-03-06 LAB
ALBUMIN SERPL-MCNC: 3.7 G/DL (ref 3.4–4.8)
ALBUMIN/GLOB SERPL: 1 RATIO (ref 1.1–2)
ALP SERPL-CCNC: 79 UNIT/L (ref 40–150)
ALT SERPL-CCNC: 16 UNIT/L (ref 0–55)
AST SERPL-CCNC: 19 UNIT/L (ref 5–34)
BILIRUB SERPL-MCNC: 0.2 MG/DL
BUN SERPL-MCNC: 15.2 MG/DL (ref 9.8–20.1)
CALCIUM SERPL-MCNC: 9.2 MG/DL (ref 8.4–10.2)
CHLORIDE SERPL-SCNC: 94 MMOL/L (ref 98–107)
CHOLEST SERPL-MCNC: 127 MG/DL
CHOLEST/HDLC SERPL: 5 {RATIO} (ref 0–5)
CO2 SERPL-SCNC: 23 MMOL/L (ref 23–31)
CREAT SERPL-MCNC: 0.84 MG/DL (ref 0.55–1.02)
ERYTHROCYTE [DISTWIDTH] IN BLOOD BY AUTOMATED COUNT: 13.3 % (ref 11.5–17)
GFR SERPLBLD CREATININE-BSD FMLA CKD-EPI: >60 MLS/MIN/1.73/M2
GLOBULIN SER-MCNC: 3.8 GM/DL (ref 2.4–3.5)
GLUCOSE SERPL-MCNC: 111 MG/DL (ref 82–115)
HCT VFR BLD AUTO: 35.4 % (ref 37–47)
HDLC SERPL-MCNC: 24 MG/DL (ref 35–60)
HGB BLD-MCNC: 12.4 G/DL (ref 12–16)
LDLC SERPL CALC-MCNC: 78 MG/DL (ref 50–140)
MCH RBC QN AUTO: 29.9 PG (ref 27–31)
MCHC RBC AUTO-ENTMCNC: 35 G/DL (ref 33–36)
MCV RBC AUTO: 85.3 FL (ref 80–94)
NRBC BLD AUTO-RTO: 0 %
PLATELET # BLD AUTO: 303 X10(3)/MCL (ref 130–400)
PMV BLD AUTO: 9.6 FL (ref 7.4–10.4)
POTASSIUM SERPL-SCNC: 4.1 MMOL/L (ref 3.5–5.1)
PROT SERPL-MCNC: 7.5 GM/DL (ref 5.8–7.6)
RBC # BLD AUTO: 4.15 X10(6)/MCL (ref 4.2–5.4)
SODIUM SERPL-SCNC: 126 MMOL/L (ref 136–145)
TRIGL SERPL-MCNC: 125 MG/DL (ref 37–140)
VLDLC SERPL CALC-MCNC: 25 MG/DL
WBC # SPEC AUTO: 7.8 X10(3)/MCL (ref 4.5–11.5)

## 2024-03-06 PROCEDURE — 80061 LIPID PANEL: CPT

## 2024-03-06 PROCEDURE — 85027 COMPLETE CBC AUTOMATED: CPT

## 2024-03-06 PROCEDURE — 80053 COMPREHEN METABOLIC PANEL: CPT

## 2024-03-06 PROCEDURE — 36415 COLL VENOUS BLD VENIPUNCTURE: CPT

## 2024-03-21 DIAGNOSIS — I65.29 CAROTID ARTERY STENOSIS: Primary | ICD-10-CM

## 2024-03-21 RX ORDER — CLOPIDOGREL BISULFATE 75 MG/1
75 TABLET ORAL DAILY
COMMUNITY
Start: 2024-02-08

## 2024-03-21 RX ORDER — METOPROLOL SUCCINATE 25 MG/1
12.5 TABLET, EXTENDED RELEASE ORAL DAILY
COMMUNITY
Start: 2024-02-08

## 2024-03-21 RX ORDER — METOPROLOL SUCCINATE 50 MG/1
50 TABLET, EXTENDED RELEASE ORAL EVERY MORNING
COMMUNITY
Start: 2024-02-12 | End: 2024-03-27

## 2024-03-25 NOTE — PROGRESS NOTES
"    [facility] Vascular - Clinic Note  Stephan Quigley MD      Patient Name: Damaris Baker                   : 1960      MRN: [id number]   Visit Date: 3/27/2024       History Present Illness     Reason for Visit: Carotid Artery Disease    Ms. Baker presents to the clinic for evaluation of carotid aneurysm as a referral from Dr. Min. She is s/p left carotid endarterectomy in  in [place] and repeat in 2017 in [place] by Dr. Maycol Lilly. She was having TIA symptoms at that time.  She has been followed by Dr. Min while living here.  She reports she was a former smoker who quit about 2 weeks ago.  She has been having chest pain and is scheduled for a heart catheterization on Monday.  She has a history of cardiac stents as well as a CABG by Dr. Peter about 2 years ago.  She denies any current symptoms of unilateral weakness, facial asymmetry, speech difficulties, or amaurosis.      REVIEW OF SYSTEMS:  12 point review of systems conducted, negative except as stated in the history of present illness. See HPI for details.        Physical Exam      Vitals:    24 0938 24 0940   BP: 124/80 121/81   BP Location: Right arm Left arm   Pulse: 68 66   Weight: 65.8 kg (145 lb)    Height: 5' 6" (1.676 m)           General: well-nourished, no acute distress, and healthy appearing, alert, pleasant, conversant, and oriented  Neurologic: cranial nerves are grossly intact, no neurologic deficits, no motor deficits, and no sensory deficits, bilaterally symmetric facial sensation  Neck/Chest: normal , soft without lymphadenopathy, and carotid bruit on the right  Respiratory: breathing easily, without respiratory distress, and normal breath sounds  Abdomen: normal and soft  Cardiology: regular rate and rhythm and no audible murmur    Upper Extremity Arterial Exam:   Right - radial is palpable and brachial is palpable  Left - radial is palpable and brachial is palpable    Musculoskeletal:   Upper Extremity: " normal bilateral hand function, 5/5 strength, 5/5   Lower Extremity: no edema present to bilateral lower extremities, 5/5 strength, flexion and extension demonstrates appropriately              Assessment and Plan     Ms. Baker is a 63 y.o. I did review the images of her CTA of the neck.  This shows a mild-to-moderate stenosis of the right ICA well below 50%.  On the left, she has postsurgical changes consistent with prior carotid endarterectomy in the left with some dilated changes to the carotid bulb.  The proximal aspect of her surgical site near the common carotid artery does have a small area with some mural thrombus.  At this point I think this is not a carotid aneurysm but likely postsurgical changes consistent with the patient's two previous to carotid endarterectomies.  She was on appropriate therapy with aspirin and Plavix as well as a statin.  She has stopped smoking and I recommend she continue to remain tobacco free.  At this point she can continue routine surveillance of her carotid with Dr. Min.  If she has any further progression of stenosis or any change in her symptoms I am happy to evaluate her, however I do not think she warrants any intervention on this at this time.            1. Carotid artery stenosis  - Ambulatory referral/consult to Vascular Surgery    2. S/P carotid endarterectomy    3. History of transient ischemic attack (TIA)          Imaging Obtained/Reviewed   Study: CTA neck  Date:   3/8/24        Medical History     Past Medical History:   Diagnosis Date    Anemia 07/13/2023    Atherosclerosis     C. difficile diarrhea     CAD (coronary artery disease) 05/31/2022    Current smoker 07/13/2023    CVD (cardiovascular disease)     Gastroesophageal reflux disease 07/13/2023    Hx of CABG 07/13/2023    Hypertension     Mixed anxiety depressive disorder 07/13/2023    Peripheral arterial occlusive disease 07/13/2023    Renal artery stenosis      Past Surgical History:   Procedure  Laterality Date    CARDIAC CATHETERIZATION      10 stents    CAROTID ENDARTERECTOMY Left 2017    CAROTID ENDARTERECTOMY Left 2007    CORONARY ARTERY BYPASS GRAFT  2021     Family History   Problem Relation Age of Onset    Dementia Mother     Atrial Septal Defect Mother     Alcohol abuse Father     Heart disease Father     Hypertension Father     Stroke Father     Cancer Sister     Cancer Sister     Depression Sister      Social History     Socioeconomic History    Marital status:    Occupational History    Occupation: unemployed   Tobacco Use    Smoking status: Former     Current packs/day: 0.00     Average packs/day: 0.5 packs/day for 42.0 years (21.0 ttl pk-yrs)     Types: Cigarettes, Vaping with nicotine     Quit date: 3/13/2024     Years since quittin.0    Smokeless tobacco: Current   Substance and Sexual Activity    Alcohol use: Not Currently    Drug use: Yes     Types: Marijuana     Comment: several times a week    Sexual activity: Yes     Partners: Male     Birth control/protection: Post-menopausal     Current Outpatient Medications   Medication Instructions    amLODIPine (NORVASC) 10 mg, Oral, Daily    aspirin (ECOTRIN) 81 mg, Oral, Daily    atorvastatin (LIPITOR) 80 mg, Oral, Daily    clopidogreL (PLAVIX) 75 mg, Oral    ezetimibe (ZETIA) 10 mg, Oral, Daily    FLUoxetine 40 mg, Oral, Every morning    hydrALAZINE (APRESOLINE) 25 mg, Oral, 2 times daily    metoprolol succinate (TOPROL-XL) 25 mg, Oral    multivit-min/ferrous fumarate (MULTI VITAMIN ORAL)   OTC G REEN AND RED multi vitamin, 0 Refill(s)    omeprazole (PRILOSEC OTC) 20 mg, Oral     Review of patient's allergies indicates:  No Known Allergies    Patient Care Team:  Diann Rice MD as PCP - General (Family Medicine)  Lalo Peter IV, MD as Consulting Physician (Cardiothoracic Surgery)  Akshat Min MD as Consulting Physician (Cardiology)        No follow-ups on file. In addition to their scheduled follow up, the  patient has also been instructed to follow up on as needed basis.     Future Appointments   Date Time Provider Department Center   7/17/2024  2:00 PM Diann Rice MD [facility] [place]   8/8/2024 12:50 PM Aspen Gomez, ANP [facility] GYN [facility]

## 2024-03-27 ENCOUNTER — OFFICE VISIT (OUTPATIENT)
Dept: VASCULAR SURGERY | Facility: CLINIC | Age: 64
End: 2024-03-27
Payer: COMMERCIAL

## 2024-03-27 VITALS
DIASTOLIC BLOOD PRESSURE: 81 MMHG | HEART RATE: 66 BPM | HEIGHT: 66 IN | WEIGHT: 145 LBS | BODY MASS INDEX: 23.3 KG/M2 | SYSTOLIC BLOOD PRESSURE: 121 MMHG

## 2024-03-27 DIAGNOSIS — I65.29 CAROTID ARTERY STENOSIS: ICD-10-CM

## 2024-03-27 DIAGNOSIS — Z98.890 S/P CAROTID ENDARTERECTOMY: Primary | ICD-10-CM

## 2024-03-27 DIAGNOSIS — Z86.73 HISTORY OF TRANSIENT ISCHEMIC ATTACK (TIA): ICD-10-CM

## 2024-03-27 DIAGNOSIS — I65.23 CAROTID STENOSIS, ASYMPTOMATIC, BILATERAL: ICD-10-CM

## 2024-03-27 PROCEDURE — 3074F SYST BP LT 130 MM HG: CPT | Mod: CPTII,,, | Performed by: SURGERY

## 2024-03-27 PROCEDURE — 99204 OFFICE O/P NEW MOD 45 MIN: CPT | Mod: ,,, | Performed by: SURGERY

## 2024-03-27 PROCEDURE — 3008F BODY MASS INDEX DOCD: CPT | Mod: CPTII,,, | Performed by: SURGERY

## 2024-03-27 PROCEDURE — 3079F DIAST BP 80-89 MM HG: CPT | Mod: CPTII,,, | Performed by: SURGERY

## 2024-03-27 PROCEDURE — 4010F ACE/ARB THERAPY RXD/TAKEN: CPT | Mod: CPTII,,, | Performed by: SURGERY

## 2024-03-27 PROCEDURE — 1159F MED LIST DOCD IN RCRD: CPT | Mod: CPTII,,, | Performed by: SURGERY

## 2024-03-27 PROCEDURE — 1160F RVW MEDS BY RX/DR IN RCRD: CPT | Mod: CPTII,,, | Performed by: SURGERY

## 2024-03-27 RX ORDER — HYDRALAZINE HYDROCHLORIDE 25 MG/1
25 TABLET, FILM COATED ORAL 2 TIMES DAILY
COMMUNITY

## 2024-03-27 RX ORDER — NAPROXEN SODIUM 220 MG/1
81 TABLET, FILM COATED ORAL DAILY
COMMUNITY

## 2024-04-01 ENCOUNTER — HOSPITAL ENCOUNTER (OUTPATIENT)
Facility: HOSPITAL | Age: 64
Discharge: HOME | End: 2024-04-01
Attending: STUDENT IN AN ORGANIZED HEALTH CARE EDUCATION/TRAINING PROGRAM | Admitting: INTERNAL MEDICINE
Payer: COMMERCIAL

## 2024-04-01 DIAGNOSIS — R07.9 CHEST PAIN: ICD-10-CM

## 2024-04-01 DIAGNOSIS — I25.10 CORONARY ATHEROSCLEROSIS OF NATIVE CORONARY ARTERY: ICD-10-CM

## 2024-04-01 DIAGNOSIS — I25.10 CAD (CORONARY ARTERY DISEASE): ICD-10-CM

## 2024-04-01 PROCEDURE — 27201423 OPTIME MED/SURG SUP & DEVICES STERILE SUPPLY: Performed by: STUDENT IN AN ORGANIZED HEALTH CARE EDUCATION/TRAINING PROGRAM

## 2024-04-01 PROCEDURE — 93799 UNLISTED CV SVC/PROCEDURE: CPT | Performed by: STUDENT IN AN ORGANIZED HEALTH CARE EDUCATION/TRAINING PROGRAM

## 2024-04-01 PROCEDURE — 63600175 PHARM REV CODE 636 W HCPCS: Performed by: STUDENT IN AN ORGANIZED HEALTH CARE EDUCATION/TRAINING PROGRAM

## 2024-04-01 PROCEDURE — C1894 INTRO/SHEATH, NON-LASER: HCPCS | Performed by: STUDENT IN AN ORGANIZED HEALTH CARE EDUCATION/TRAINING PROGRAM

## 2024-04-01 PROCEDURE — 93459 L HRT ART/GRFT ANGIO: CPT | Performed by: STUDENT IN AN ORGANIZED HEALTH CARE EDUCATION/TRAINING PROGRAM

## 2024-04-01 PROCEDURE — 99153 MOD SED SAME PHYS/QHP EA: CPT | Performed by: STUDENT IN AN ORGANIZED HEALTH CARE EDUCATION/TRAINING PROGRAM

## 2024-04-01 PROCEDURE — 93010 ELECTROCARDIOGRAM REPORT: CPT | Mod: ,,, | Performed by: INTERNAL MEDICINE

## 2024-04-01 PROCEDURE — 25500020 PHARM REV CODE 255: Performed by: STUDENT IN AN ORGANIZED HEALTH CARE EDUCATION/TRAINING PROGRAM

## 2024-04-01 PROCEDURE — C1769 GUIDE WIRE: HCPCS | Performed by: STUDENT IN AN ORGANIZED HEALTH CARE EDUCATION/TRAINING PROGRAM

## 2024-04-01 PROCEDURE — C1887 CATHETER, GUIDING: HCPCS | Performed by: STUDENT IN AN ORGANIZED HEALTH CARE EDUCATION/TRAINING PROGRAM

## 2024-04-01 PROCEDURE — 93005 ELECTROCARDIOGRAM TRACING: CPT

## 2024-04-01 PROCEDURE — 25000003 PHARM REV CODE 250: Performed by: STUDENT IN AN ORGANIZED HEALTH CARE EDUCATION/TRAINING PROGRAM

## 2024-04-01 PROCEDURE — 99152 MOD SED SAME PHYS/QHP 5/>YRS: CPT | Performed by: STUDENT IN AN ORGANIZED HEALTH CARE EDUCATION/TRAINING PROGRAM

## 2024-04-01 PROCEDURE — 75630 X-RAY AORTA LEG ARTERIES: CPT | Mod: XS | Performed by: STUDENT IN AN ORGANIZED HEALTH CARE EDUCATION/TRAINING PROGRAM

## 2024-04-01 RX ORDER — SODIUM CHLORIDE 9 MG/ML
INJECTION, SOLUTION INTRAVENOUS CONTINUOUS
Status: ACTIVE | OUTPATIENT
Start: 2024-04-01 | End: 2024-04-01

## 2024-04-01 RX ORDER — HYDRALAZINE HYDROCHLORIDE 20 MG/ML
10 INJECTION INTRAMUSCULAR; INTRAVENOUS EVERY 4 HOURS PRN
Status: DISCONTINUED | OUTPATIENT
Start: 2024-04-01 | End: 2024-04-01 | Stop reason: HOSPADM

## 2024-04-01 RX ORDER — ONDANSETRON 4 MG/1
8 TABLET, ORALLY DISINTEGRATING ORAL EVERY 8 HOURS PRN
Status: DISCONTINUED | OUTPATIENT
Start: 2024-04-01 | End: 2024-04-01 | Stop reason: HOSPADM

## 2024-04-01 RX ORDER — HEPARIN SODIUM 1000 [USP'U]/ML
INJECTION, SOLUTION INTRAVENOUS; SUBCUTANEOUS
Status: DISCONTINUED | OUTPATIENT
Start: 2024-04-01 | End: 2024-04-01 | Stop reason: HOSPADM

## 2024-04-01 RX ORDER — ACETAMINOPHEN 325 MG/1
650 TABLET ORAL EVERY 4 HOURS PRN
Status: DISCONTINUED | OUTPATIENT
Start: 2024-04-01 | End: 2024-04-01 | Stop reason: HOSPADM

## 2024-04-01 RX ORDER — SODIUM CHLORIDE 0.9 % (FLUSH) 0.9 %
10 SYRINGE (ML) INJECTION
Status: DISCONTINUED | OUTPATIENT
Start: 2024-04-01 | End: 2024-05-08

## 2024-04-01 RX ORDER — FENTANYL CITRATE 50 UG/ML
INJECTION, SOLUTION INTRAMUSCULAR; INTRAVENOUS
Status: DISCONTINUED | OUTPATIENT
Start: 2024-04-01 | End: 2024-04-01 | Stop reason: HOSPADM

## 2024-04-01 RX ORDER — MORPHINE SULFATE 4 MG/ML
2 INJECTION, SOLUTION INTRAMUSCULAR; INTRAVENOUS EVERY 4 HOURS PRN
Status: DISCONTINUED | OUTPATIENT
Start: 2024-04-01 | End: 2024-04-01 | Stop reason: HOSPADM

## 2024-04-01 RX ORDER — DIAZEPAM 5 MG/1
10 TABLET ORAL
Status: DISPENSED | OUTPATIENT
Start: 2024-04-01

## 2024-04-01 RX ORDER — SODIUM CHLORIDE 9 MG/ML
INJECTION, SOLUTION INTRAVENOUS ONCE
Status: COMPLETED | OUTPATIENT
Start: 2024-04-01 | End: 2024-04-01

## 2024-04-01 RX ORDER — LIDOCAINE HYDROCHLORIDE 10 MG/ML
INJECTION INFILTRATION; PERINEURAL
Status: DISCONTINUED | OUTPATIENT
Start: 2024-04-01 | End: 2024-04-01 | Stop reason: HOSPADM

## 2024-04-01 RX ORDER — HYDROCODONE BITARTRATE AND ACETAMINOPHEN 5; 325 MG/1; MG/1
1 TABLET ORAL EVERY 4 HOURS PRN
Status: DISCONTINUED | OUTPATIENT
Start: 2024-04-01 | End: 2024-04-01 | Stop reason: HOSPADM

## 2024-04-01 RX ORDER — DIPHENHYDRAMINE HCL 25 MG
50 CAPSULE ORAL
Status: DISPENSED | OUTPATIENT
Start: 2024-04-01

## 2024-04-01 RX ORDER — MIDAZOLAM HYDROCHLORIDE 1 MG/ML
INJECTION INTRAMUSCULAR; INTRAVENOUS
Status: DISCONTINUED | OUTPATIENT
Start: 2024-04-01 | End: 2024-04-01 | Stop reason: HOSPADM

## 2024-04-01 RX ADMIN — DIAZEPAM 10 MG: 5 TABLET ORAL at 09:04

## 2024-04-01 RX ADMIN — SODIUM CHLORIDE: 9 INJECTION, SOLUTION INTRAVENOUS at 01:04

## 2024-04-01 RX ADMIN — DIPHENHYDRAMINE HYDROCHLORIDE 50 MG: 25 CAPSULE ORAL at 09:04

## 2024-04-01 RX ADMIN — SODIUM CHLORIDE: 9 INJECTION, SOLUTION INTRAVENOUS at 09:04

## 2024-04-01 NOTE — Clinical Note
The catheter was repositioned into the ostial SVG graft. Hemodynamics were performed.  An angiography was performed of the right coronary arteries. Multiple views were taken. The angiography was performed via power injection.  SVG to RCA

## 2024-04-01 NOTE — Clinical Note
The catheter was inserted into the, was removed from the and was inserted over the wire into the ostium   left anterior descending. Hemodynamics were performed.

## 2024-04-01 NOTE — Clinical Note
An angiography was performed of the aorta and groin. The angiography was performed via power injection.  Bilat iliacs

## 2024-04-01 NOTE — Clinical Note
The catheter was inserted into the, was removed from the, insertion attempt was made into the and was inserted over the wire into the. LIMA to LAD

## 2024-04-02 LAB
OHS QRS DURATION: 92 MS
OHS QTC CALCULATION: 445 MS

## 2024-04-03 VITALS
RESPIRATION RATE: 13 BRPM | HEIGHT: 67 IN | SYSTOLIC BLOOD PRESSURE: 151 MMHG | TEMPERATURE: 98 F | BODY MASS INDEX: 24.22 KG/M2 | OXYGEN SATURATION: 97 % | DIASTOLIC BLOOD PRESSURE: 72 MMHG | WEIGHT: 154.31 LBS | HEART RATE: 61 BPM

## 2024-04-03 NOTE — DISCHARGE SUMMARY
[facility] - Cath Lab Services  Discharge Note  Short Stay    Procedure(s) (LRB):  Left heart cath (Left)      OUTCOME: Patient tolerated treatment/procedure well without complication and is now ready for discharge.    DISPOSITION: Home or Self Care    FINAL DIAGNOSIS:  CAD    FOLLOWUP: In clinic    DISCHARGE INSTRUCTIONS:  No discharge procedures on file.      Clinical Reference Documents Added to Patient Instructions         Document    CARDIAC CATHETERIZATION DISCHARGE INSTRUCTIONS (ENGLISH)    MODERATE SEDATION IN ADULTS DISCHARGE INSTRUCTIONS (ENGLISH)            TIME SPENT ON DISCHARGE: 30 minutes

## 2024-05-07 DIAGNOSIS — R42 DIZZINESS: Primary | ICD-10-CM

## 2024-05-07 DIAGNOSIS — I65.29 INTRACRANIAL CAROTID STENOSIS: ICD-10-CM

## 2024-05-08 ENCOUNTER — LAB VISIT (OUTPATIENT)
Dept: LAB | Facility: HOSPITAL | Age: 64
End: 2024-05-08
Attending: NURSE PRACTITIONER
Payer: COMMERCIAL

## 2024-05-08 ENCOUNTER — OFFICE VISIT (OUTPATIENT)
Dept: FAMILY MEDICINE | Facility: CLINIC | Age: 64
End: 2024-05-08
Payer: COMMERCIAL

## 2024-05-08 VITALS
HEART RATE: 54 BPM | WEIGHT: 156.13 LBS | DIASTOLIC BLOOD PRESSURE: 68 MMHG | TEMPERATURE: 98 F | OXYGEN SATURATION: 98 % | RESPIRATION RATE: 20 BRPM | SYSTOLIC BLOOD PRESSURE: 130 MMHG | HEIGHT: 67 IN | BODY MASS INDEX: 24.51 KG/M2

## 2024-05-08 DIAGNOSIS — I10 PRIMARY HYPERTENSION: Primary | ICD-10-CM

## 2024-05-08 DIAGNOSIS — I10 PRIMARY HYPERTENSION: ICD-10-CM

## 2024-05-08 DIAGNOSIS — E87.1 HYPONATREMIA: ICD-10-CM

## 2024-05-08 DIAGNOSIS — F41.8 MIXED ANXIETY DEPRESSIVE DISORDER: ICD-10-CM

## 2024-05-08 LAB
ALBUMIN SERPL-MCNC: 3.8 G/DL (ref 3.4–4.8)
ALBUMIN/GLOB SERPL: 1.2 RATIO (ref 1.1–2)
ALP SERPL-CCNC: 61 UNIT/L (ref 40–150)
ALT SERPL-CCNC: 17 UNIT/L (ref 0–55)
AST SERPL-CCNC: 21 UNIT/L (ref 5–34)
BILIRUB SERPL-MCNC: 0.4 MG/DL
BUN SERPL-MCNC: 11.5 MG/DL (ref 9.8–20.1)
CALCIUM SERPL-MCNC: 9.1 MG/DL (ref 8.4–10.2)
CHLORIDE SERPL-SCNC: 98 MMOL/L (ref 98–107)
CO2 SERPL-SCNC: 23 MMOL/L (ref 23–31)
CREAT SERPL-MCNC: 1.05 MG/DL (ref 0.55–1.02)
GFR SERPLBLD CREATININE-BSD FMLA CKD-EPI: 59 ML/MIN/1.73/M2
GLOBULIN SER-MCNC: 3.3 GM/DL (ref 2.4–3.5)
GLUCOSE SERPL-MCNC: 93 MG/DL (ref 82–115)
POTASSIUM SERPL-SCNC: 4.7 MMOL/L (ref 3.5–5.1)
PROT SERPL-MCNC: 7.1 GM/DL (ref 5.8–7.6)
SODIUM SERPL-SCNC: 130 MMOL/L (ref 136–145)

## 2024-05-08 PROCEDURE — 1160F RVW MEDS BY RX/DR IN RCRD: CPT | Mod: CPTII,,, | Performed by: NURSE PRACTITIONER

## 2024-05-08 PROCEDURE — 36415 COLL VENOUS BLD VENIPUNCTURE: CPT

## 2024-05-08 PROCEDURE — 3008F BODY MASS INDEX DOCD: CPT | Mod: CPTII,,, | Performed by: NURSE PRACTITIONER

## 2024-05-08 PROCEDURE — 4010F ACE/ARB THERAPY RXD/TAKEN: CPT | Mod: CPTII,,, | Performed by: NURSE PRACTITIONER

## 2024-05-08 PROCEDURE — 99214 OFFICE O/P EST MOD 30 MIN: CPT | Mod: ,,, | Performed by: NURSE PRACTITIONER

## 2024-05-08 PROCEDURE — 80053 COMPREHEN METABOLIC PANEL: CPT

## 2024-05-08 PROCEDURE — 3075F SYST BP GE 130 - 139MM HG: CPT | Mod: CPTII,,, | Performed by: NURSE PRACTITIONER

## 2024-05-08 PROCEDURE — 3078F DIAST BP <80 MM HG: CPT | Mod: CPTII,,, | Performed by: NURSE PRACTITIONER

## 2024-05-08 PROCEDURE — 1159F MED LIST DOCD IN RCRD: CPT | Mod: CPTII,,, | Performed by: NURSE PRACTITIONER

## 2024-05-08 NOTE — ASSESSMENT & PLAN NOTE
Stable on Fluoxetine, but low sodium levels  Pt is reluctant to change meds  Will repeat labs today for revaluation  Will call pt with results and recommendations pending results

## 2024-05-08 NOTE — PROGRESS NOTES
Subjective:      Patient ID: Damaris Baker is a 63 y.o. White female      Chief Complaint: Medication Management       Past Medical History:   Diagnosis Date    Anemia 07/13/2023    Atherosclerosis     C. difficile diarrhea     CAD (coronary artery disease) 05/31/2022    Current smoker 07/13/2023    CVD (cardiovascular disease)     Gastroesophageal reflux disease 07/13/2023    Hx of CABG 07/13/2023    Hypertension     Mixed anxiety depressive disorder 07/13/2023    Peripheral arterial occlusive disease 07/13/2023    Renal artery stenosis         HPI  Presents today for re-evaluation of BP.      States recent LHC, with left subclavian artery stenosis and stenting; (need to verify this) and coronary stenosis.      HTN: BP elevated at previous visit, at goal today.  Currently taking Norvasc 10 mg po daily, Metoprolol XL 25 (1/2 tablet) mg po daily, and Hydralazine 25 BID.  States compliance with medications. Medications changes per Dr. Min.  Her Lisinopril has been discontinued, BB decreased due to bradycardia, and Hydralazine stated.   Denies any headaches, dizziness, syncope, CP, or SOB.     Mixed Anxiety and Depressive disorder:  Doing well on Fluoxetine but Na+ level has been low, which is an adverse effect of Fluoxetine.  Her Cardiologist wants her to d/c meds;.  Pt is extremely reluctant, states medication works extremely well.  Denies any other problems.            Patient Care Team:  Diann Rice MD as PCP - General (Family Medicine)  Lalo Peter IV, MD as Consulting Physician (Cardiothoracic Surgery)  Akshat Min MD as Consulting Physician (Cardiology)      Review of Systems   Constitutional: Negative.  Negative for appetite change, chills and fever.   HENT: Negative.     Eyes: Negative.  Negative for discharge and redness.   Respiratory: Negative.  Negative for shortness of breath.    Cardiovascular: Negative.  Negative for chest pain.   Gastrointestinal: Negative.    Endocrine:  Negative.    Genitourinary: Negative.    Integumentary:  Negative.   Allergic/Immunologic: Negative.    Neurological: Negative.    Psychiatric/Behavioral: Negative.     All other systems reviewed and are negative.          Objective:      Vitals:    05/08/24 1442   BP: (!) 142/68   Pulse: (!) 54   Resp: 20   Temp: 97.9 °F (36.6 °C)      Body mass index is 24.45 kg/m².     Physical Exam  Vitals reviewed.   Constitutional:       Appearance: Normal appearance.   HENT:      Head: Normocephalic.      Mouth/Throat:      Mouth: Mucous membranes are moist.   Eyes:      Conjunctiva/sclera: Conjunctivae normal.      Pupils: Pupils are equal, round, and reactive to light.   Cardiovascular:      Rate and Rhythm: Normal rate and regular rhythm.   Pulmonary:      Effort: Pulmonary effort is normal. No respiratory distress.      Breath sounds: Normal breath sounds.   Musculoskeletal:         General: Normal range of motion.      Cervical back: Normal range of motion and neck supple.   Skin:     General: Skin is warm and dry.   Neurological:      Mental Status: She is alert and oriented to person, place, and time.   Psychiatric:         Mood and Affect: Mood normal.            Current Outpatient Medications:     amLODIPine (NORVASC) 10 MG tablet, Take 1 tablet (10 mg total) by mouth once daily., Disp: 90 tablet, Rfl: 3    aspirin 81 MG Chew, Take 81 mg by mouth once daily., Disp: , Rfl:     atorvastatin (LIPITOR) 80 MG tablet, Take 1 tablet (80 mg total) by mouth once daily., Disp: 90 tablet, Rfl: 3    clopidogreL (PLAVIX) 75 mg tablet, Take 75 mg by mouth once daily., Disp: , Rfl:     ezetimibe (ZETIA) 10 mg tablet, Take 10 mg by mouth once daily., Disp: , Rfl:     FLUoxetine 40 MG capsule, Take 1 capsule (40 mg total) by mouth every morning., Disp: 90 capsule, Rfl: 3    hydrALAZINE (APRESOLINE) 25 MG tablet, Take 25 mg by mouth 2 (two) times a day., Disp: , Rfl:     metoprolol succinate (TOPROL-XL) 25 MG 24 hr tablet, Take 12.5  mg by mouth once daily., Disp: , Rfl:     multivit-min/ferrous fumarate (MULTI VITAMIN ORAL), Take 1 tablet by mouth Daily., Disp: , Rfl:     omeprazole (PRILOSEC OTC) 20 MG tablet, Take 20 mg by mouth every morning., Disp: , Rfl:   No current facility-administered medications for this visit.    Facility-Administered Medications Ordered in Other Visits:     diazePAM tablet 10 mg, 10 mg, Oral, On Call Procedure, Kalyan Gonsalez Jr., MD, 10 mg at 04/01/24 0951    diphenhydrAMINE capsule 50 mg, 50 mg, Oral, On Call Procedure, Kalyan Gonsalez Jr., MD, 50 mg at 04/01/24 0951    sodium chloride 0.9% flush 10 mL, 10 mL, Intravenous, PRN, Kalyan Gonsalez Jr., MD    Assessment & Plan:     Problem List Items Addressed This Visit          Cardiac/Vascular    Primary hypertension - Primary    Relevant Orders    Comprehensive Metabolic Panel     Other Visit Diagnoses       Hyponatremia        Relevant Orders    Comprehensive Metabolic Panel

## 2024-05-08 NOTE — ASSESSMENT & PLAN NOTE
Likely s/t Fluoxetine  Will repeat labs today for revaluation  Will call pt with results and recommendations pending results

## 2024-05-08 NOTE — ASSESSMENT & PLAN NOTE
BP at goal  Continue current medication (Norvasc 10 mg po daily, Metoprolol XL 25 (1/2 tablet) mg po daily, and Hydralazine 25 BID) as prescribed  Daily BP check; bring log all clinic visits  Instructed to report to ED for any BP greater than 200/100, dizziness, syncope, CP, or SOB  Keep appt. With PCP for follow up  Patient is agreeable to plan and verbalizes understanding

## 2024-05-10 DIAGNOSIS — E87.1 HYPONATREMIA: Primary | ICD-10-CM

## 2024-05-10 RX ORDER — FLUOXETINE HYDROCHLORIDE 20 MG/1
20 CAPSULE ORAL EVERY MORNING
Qty: 90 CAPSULE | Refills: 1 | Status: SHIPPED | OUTPATIENT
Start: 2024-05-10

## 2024-05-28 ENCOUNTER — TELEPHONE (OUTPATIENT)
Dept: FAMILY MEDICINE | Facility: CLINIC | Age: 64
End: 2024-05-28
Payer: COMMERCIAL

## 2024-05-29 NOTE — TELEPHONE ENCOUNTER
Phoned pt to instruct to get repeat lab done to check Na+ level. No answer, unable to leave VM. Waiting on call back from pt.

## 2024-05-29 NOTE — TELEPHONE ENCOUNTER
Pt returned call to clinic. Instructed to repeat labs for Na+ level; will call with results.   Pt voiced understood.

## 2024-07-02 ENCOUNTER — TELEPHONE (OUTPATIENT)
Dept: FAMILY MEDICINE | Facility: CLINIC | Age: 64
End: 2024-07-02
Payer: COMMERCIAL

## 2024-07-02 DIAGNOSIS — K21.9 GASTROESOPHAGEAL REFLUX DISEASE, UNSPECIFIED WHETHER ESOPHAGITIS PRESENT: ICD-10-CM

## 2024-07-02 DIAGNOSIS — I10 PRIMARY HYPERTENSION: ICD-10-CM

## 2024-07-02 DIAGNOSIS — F41.8 MIXED ANXIETY DEPRESSIVE DISORDER: ICD-10-CM

## 2024-07-02 DIAGNOSIS — Z00.00 WELLNESS EXAMINATION: ICD-10-CM

## 2024-07-02 DIAGNOSIS — Z78.0 POSTMENOPAUSAL: ICD-10-CM

## 2024-07-02 DIAGNOSIS — D64.9 ANEMIA, UNSPECIFIED TYPE: ICD-10-CM

## 2024-07-02 DIAGNOSIS — I77.9 PERIPHERAL ARTERIAL OCCLUSIVE DISEASE: ICD-10-CM

## 2024-07-02 DIAGNOSIS — F17.200 CURRENT SMOKER: ICD-10-CM

## 2024-07-02 DIAGNOSIS — E78.2 MIXED HYPERLIPIDEMIA: ICD-10-CM

## 2024-07-02 DIAGNOSIS — E87.1 HYPONATREMIA: Primary | ICD-10-CM

## 2024-07-17 PROBLEM — Z98.890 HISTORY OF CEA (CAROTID ENDARTERECTOMY): Status: ACTIVE | Noted: 2024-07-17

## 2024-09-25 ENCOUNTER — PATIENT OUTREACH (OUTPATIENT)
Facility: CLINIC | Age: 64
End: 2024-09-25
Payer: COMMERCIAL

## 2024-10-21 PROBLEM — Z00.00 WELLNESS EXAMINATION: Status: RESOLVED | Noted: 2023-07-13 | Resolved: 2024-10-21

## 2024-10-31 DIAGNOSIS — I10 PRIMARY HYPERTENSION: ICD-10-CM

## 2024-10-31 DIAGNOSIS — F41.8 MIXED ANXIETY DEPRESSIVE DISORDER: Primary | ICD-10-CM

## 2024-10-31 DIAGNOSIS — E78.2 MIXED HYPERLIPIDEMIA: ICD-10-CM

## 2024-10-31 RX ORDER — FLUOXETINE HYDROCHLORIDE 20 MG/1
20 CAPSULE ORAL EVERY MORNING
Qty: 90 CAPSULE | Refills: 0 | Status: SHIPPED | OUTPATIENT
Start: 2024-10-31 | End: 2025-01-29

## 2024-10-31 RX ORDER — AMLODIPINE BESYLATE 10 MG/1
10 TABLET ORAL DAILY
Qty: 90 TABLET | Refills: 0 | Status: SHIPPED | OUTPATIENT
Start: 2024-10-31 | End: 2025-01-29

## 2024-10-31 RX ORDER — ATORVASTATIN CALCIUM 80 MG/1
80 TABLET, FILM COATED ORAL DAILY
Qty: 90 TABLET | Refills: 0 | Status: SHIPPED | OUTPATIENT
Start: 2024-10-31 | End: 2025-01-29

## 2025-02-27 ENCOUNTER — PATIENT OUTREACH (OUTPATIENT)
Facility: CLINIC | Age: 65
End: 2025-02-27
Payer: COMMERCIAL

## 2025-02-27 NOTE — PROGRESS NOTES
Attempt made to contact patient. No answer. No vm set up.   Next PCP F/U:  none scheduled. NEW PCP? PT MOVED? Letter mailed  SDOH Incomplete- financial, food, transportation  Health Maintenance Topics Overdue:  Osteoporosis Screening  Mammogram  Cervical Cancer Screening- Appt 6/20/24 & 8/8/24 cancelled  High Dose Statin- Atorvastatin. NON-compliance per dispense history. PDC 0%. Expires 1/29/2025       HTN: uncontrolled. At goal at [place].   Compliance with Metoprolol only.   NON compliance with norvasc & hydralazine

## 2025-02-27 NOTE — LETTER
Dear Damaris    Your health and well being are very important to us. Our records indicate that your last appointment with Diann Rice MD was on 5/8/2024 . Please contact the clinic at [phone] to schedule an appointment.     [facility] is committed to your overall health. We have checked the health information in your chart to make sure you are up to date.     Our review of your chart shows that you may be due for     Mammogram  Pap Smear  Bone Density Scan    If you have done any of these somewhere else, please let us know so we can update your chart.     If you would like, I can help get these items ordered and/or scheduled for you (if needed) and also schedule your follow up appointment with Dr Rice. Please reply to this message in your patient portal or give me a call at [phone].      If you have established care with another provider, please let us know so that we may update your chart. You may send me a portal message or call.     Thank you for choosing [facility] & have a great day!    ABIMBOLA Hobbs Care Coordinator  Diann Rice MD and your [facility] Team

## (undated) DEVICE — KIT MINI STICK MAX 5F 21GX10CM

## (undated) DEVICE — SYS WASTE FLD DISPOSAL 1400ML

## (undated) DEVICE — SHEATH INTRODUCER 5FR 10CM

## (undated) DEVICE — GUIDEWIRE STF .035X260CM ANG

## (undated) DEVICE — PAD DEFIB CADENCE ADULT R2

## (undated) DEVICE — TUBING HP AIRLSS ROT ADPT 30IN

## (undated) DEVICE — CATH IMPULSE PIGTAIL 5FR 125CM

## (undated) DEVICE — SHEATH INTRODUCER 6FR 11CM

## (undated) DEVICE — CATH IMPULSE 5F 100CM FR4

## (undated) DEVICE — KIT HAND CONTROL HIGH PRESSUR

## (undated) DEVICE — GUIDEWIRE OMNI STR TIP 185CM

## (undated) DEVICE — DEVICE BASIXCOMPAK INFL 20ML

## (undated) DEVICE — WIRE GUIDE INQWIRE STR 180CM

## (undated) DEVICE — CATH IMPULSE FL4 5FR 100CM

## (undated) DEVICE — CANNULA NASAL ADULT

## (undated) DEVICE — GUIDE LAUNCHER 6FR EBU 3.5